# Patient Record
Sex: MALE | Race: WHITE | NOT HISPANIC OR LATINO | URBAN - METROPOLITAN AREA
[De-identification: names, ages, dates, MRNs, and addresses within clinical notes are randomized per-mention and may not be internally consistent; named-entity substitution may affect disease eponyms.]

---

## 2018-02-16 ENCOUNTER — OUTPATIENT (OUTPATIENT)
Dept: OUTPATIENT SERVICES | Facility: HOSPITAL | Age: 63
LOS: 1 days | Discharge: HOME | End: 2018-02-16
Payer: COMMERCIAL

## 2018-02-16 VITALS
WEIGHT: 178.35 LBS | HEART RATE: 76 BPM | TEMPERATURE: 99 F | SYSTOLIC BLOOD PRESSURE: 131 MMHG | RESPIRATION RATE: 17 BRPM | OXYGEN SATURATION: 97 % | DIASTOLIC BLOOD PRESSURE: 81 MMHG | HEIGHT: 71 IN

## 2018-02-16 DIAGNOSIS — Z98.890 OTHER SPECIFIED POSTPROCEDURAL STATES: Chronic | ICD-10-CM

## 2018-02-16 DIAGNOSIS — M54.16 RADICULOPATHY, LUMBAR REGION: ICD-10-CM

## 2018-02-16 DIAGNOSIS — Z01.818 ENCOUNTER FOR OTHER PREPROCEDURAL EXAMINATION: ICD-10-CM

## 2018-02-16 LAB
ANION GAP SERPL CALC-SCNC: 3 MMOL/L — LOW (ref 5–17)
APTT BLD: 32.2 SEC — SIGNIFICANT CHANGE UP (ref 27.5–37.4)
BLD GP AB SCN SERPL QL: SIGNIFICANT CHANGE UP
BUN SERPL-MCNC: 15 MG/DL — SIGNIFICANT CHANGE UP (ref 7–23)
CALCIUM SERPL-MCNC: 8.9 MG/DL — SIGNIFICANT CHANGE UP (ref 8.5–10.1)
CHLORIDE SERPL-SCNC: 105 MMOL/L — SIGNIFICANT CHANGE UP (ref 96–108)
CO2 SERPL-SCNC: 32 MMOL/L — HIGH (ref 22–31)
CREAT SERPL-MCNC: 0.92 MG/DL — SIGNIFICANT CHANGE UP (ref 0.5–1.3)
GLUCOSE SERPL-MCNC: 99 MG/DL — SIGNIFICANT CHANGE UP (ref 70–99)
HBA1C BLD-MCNC: 5.5 % — SIGNIFICANT CHANGE UP (ref 4–5.6)
HCT VFR BLD CALC: 39.4 % — SIGNIFICANT CHANGE UP (ref 39–50)
HGB BLD-MCNC: 12.9 G/DL — LOW (ref 13–17)
INR BLD: 0.95 RATIO — SIGNIFICANT CHANGE UP (ref 0.88–1.16)
MCHC RBC-ENTMCNC: 27 PG — SIGNIFICANT CHANGE UP (ref 27–34)
MCHC RBC-ENTMCNC: 32.7 GM/DL — SIGNIFICANT CHANGE UP (ref 32–36)
MCV RBC AUTO: 82.6 FL — SIGNIFICANT CHANGE UP (ref 80–100)
NRBC # BLD: 0 /100 WBCS — SIGNIFICANT CHANGE UP (ref 0–0)
PLATELET # BLD AUTO: 317 K/UL — SIGNIFICANT CHANGE UP (ref 150–400)
POTASSIUM SERPL-MCNC: 4.4 MMOL/L — SIGNIFICANT CHANGE UP (ref 3.5–5.3)
POTASSIUM SERPL-SCNC: 4.4 MMOL/L — SIGNIFICANT CHANGE UP (ref 3.5–5.3)
PROTHROM AB SERPL-ACNC: 10.3 SEC — SIGNIFICANT CHANGE UP (ref 9.8–12.7)
RBC # BLD: 4.77 M/UL — SIGNIFICANT CHANGE UP (ref 4.2–5.8)
RBC # FLD: 12.5 % — SIGNIFICANT CHANGE UP (ref 10.3–14.5)
SODIUM SERPL-SCNC: 140 MMOL/L — SIGNIFICANT CHANGE UP (ref 135–145)
WBC # BLD: 7.68 K/UL — SIGNIFICANT CHANGE UP (ref 3.8–10.5)
WBC # FLD AUTO: 7.68 K/UL — SIGNIFICANT CHANGE UP (ref 3.8–10.5)

## 2018-02-16 PROCEDURE — 93010 ELECTROCARDIOGRAM REPORT: CPT | Mod: NC

## 2018-02-16 NOTE — PATIENT PROFILE ADULT. - PSH
S/P cervical disc replacement  2017  S/P gastric surgery  - Gastric Sleeve ( 4 years ago )  S/P lumbar laminectomy  2 years ago

## 2018-02-16 NOTE — H&P PST ADULT - HISTORY OF PRESENT ILLNESS
A 62 M with HTN c/o back and leg pain.   Patient s/p Lumbar Laminectomy 2 years ago. Post op was doing well with radiculopathic sign improved until Last December, patient started complaining of Back pain radiating to the right post thigh/leg associated with numbness o n the medial thigh.  Persistence of symptoms prompted consult and advised this procedure. +Weakness on right LE

## 2018-03-02 ENCOUNTER — INPATIENT (INPATIENT)
Facility: HOSPITAL | Age: 63
LOS: 2 days | Discharge: HOME | End: 2018-03-05
Attending: ORTHOPAEDIC SURGERY | Admitting: ORTHOPAEDIC SURGERY
Payer: COMMERCIAL

## 2018-03-02 ENCOUNTER — RESULT REVIEW (OUTPATIENT)
Age: 63
End: 2018-03-02

## 2018-03-02 ENCOUNTER — TRANSCRIPTION ENCOUNTER (OUTPATIENT)
Age: 63
End: 2018-03-02

## 2018-03-02 VITALS
TEMPERATURE: 97 F | DIASTOLIC BLOOD PRESSURE: 63 MMHG | WEIGHT: 169.09 LBS | HEIGHT: 70 IN | OXYGEN SATURATION: 98 % | SYSTOLIC BLOOD PRESSURE: 112 MMHG | RESPIRATION RATE: 15 BRPM | HEART RATE: 67 BPM

## 2018-03-02 DIAGNOSIS — Z98.890 OTHER SPECIFIED POSTPROCEDURAL STATES: Chronic | ICD-10-CM

## 2018-03-02 PROCEDURE — 88304 TISSUE EXAM BY PATHOLOGIST: CPT | Mod: 26

## 2018-03-02 RX ORDER — OXYCODONE HYDROCHLORIDE 5 MG/1
15 TABLET ORAL EVERY 4 HOURS
Qty: 0 | Refills: 0 | Status: DISCONTINUED | OUTPATIENT
Start: 2018-03-02 | End: 2018-03-04

## 2018-03-02 RX ORDER — OXYCODONE HYDROCHLORIDE 5 MG/1
20 TABLET ORAL EVERY 4 HOURS
Qty: 0 | Refills: 0 | Status: DISCONTINUED | OUTPATIENT
Start: 2018-03-02 | End: 2018-03-04

## 2018-03-02 RX ORDER — PANTOPRAZOLE SODIUM 20 MG/1
40 TABLET, DELAYED RELEASE ORAL
Qty: 0 | Refills: 0 | Status: DISCONTINUED | OUTPATIENT
Start: 2018-03-02 | End: 2018-03-05

## 2018-03-02 RX ORDER — HYDROMORPHONE HYDROCHLORIDE 2 MG/ML
1 INJECTION INTRAMUSCULAR; INTRAVENOUS; SUBCUTANEOUS
Qty: 0 | Refills: 0 | Status: DISCONTINUED | OUTPATIENT
Start: 2018-03-02 | End: 2018-03-02

## 2018-03-02 RX ORDER — METOCLOPRAMIDE HCL 10 MG
10 TABLET ORAL ONCE
Qty: 0 | Refills: 0 | Status: DISCONTINUED | OUTPATIENT
Start: 2018-03-02 | End: 2018-03-05

## 2018-03-02 RX ORDER — ACETAMINOPHEN 500 MG
1000 TABLET ORAL ONCE
Qty: 0 | Refills: 0 | Status: COMPLETED | OUTPATIENT
Start: 2018-03-02 | End: 2018-03-03

## 2018-03-02 RX ORDER — LOSARTAN POTASSIUM 100 MG/1
100 TABLET, FILM COATED ORAL DAILY
Qty: 0 | Refills: 0 | Status: DISCONTINUED | OUTPATIENT
Start: 2018-03-02 | End: 2018-03-05

## 2018-03-02 RX ORDER — HYDROMORPHONE HYDROCHLORIDE 2 MG/ML
0.5 INJECTION INTRAMUSCULAR; INTRAVENOUS; SUBCUTANEOUS ONCE
Qty: 0 | Refills: 0 | Status: DISCONTINUED | OUTPATIENT
Start: 2018-03-02 | End: 2018-03-02

## 2018-03-02 RX ORDER — OXYCODONE HYDROCHLORIDE 5 MG/1
15 TABLET ORAL EVERY 4 HOURS
Qty: 0 | Refills: 0 | Status: DISCONTINUED | OUTPATIENT
Start: 2018-03-02 | End: 2018-03-02

## 2018-03-02 RX ORDER — OXYCODONE HYDROCHLORIDE 5 MG/1
1 TABLET ORAL
Qty: 0 | Refills: 0 | COMMUNITY

## 2018-03-02 RX ORDER — CYCLOBENZAPRINE HYDROCHLORIDE 10 MG/1
10 TABLET, FILM COATED ORAL EVERY 8 HOURS
Qty: 0 | Refills: 0 | Status: DISCONTINUED | OUTPATIENT
Start: 2018-03-02 | End: 2018-03-05

## 2018-03-02 RX ORDER — SODIUM CHLORIDE 9 MG/ML
1000 INJECTION, SOLUTION INTRAVENOUS
Qty: 0 | Refills: 0 | Status: DISCONTINUED | OUTPATIENT
Start: 2018-03-02 | End: 2018-03-02

## 2018-03-02 RX ORDER — OXYCODONE HYDROCHLORIDE 5 MG/1
20 TABLET ORAL EVERY 12 HOURS
Qty: 0 | Refills: 0 | Status: DISCONTINUED | OUTPATIENT
Start: 2018-03-02 | End: 2018-03-05

## 2018-03-02 RX ORDER — DIPHENHYDRAMINE HCL 50 MG
12.5 CAPSULE ORAL EVERY 4 HOURS
Qty: 0 | Refills: 0 | Status: DISCONTINUED | OUTPATIENT
Start: 2018-03-02 | End: 2018-03-04

## 2018-03-02 RX ORDER — SODIUM CHLORIDE 9 MG/ML
1000 INJECTION, SOLUTION INTRAVENOUS
Qty: 0 | Refills: 0 | Status: DISCONTINUED | OUTPATIENT
Start: 2018-03-02 | End: 2018-03-04

## 2018-03-02 RX ORDER — CEFAZOLIN SODIUM 1 G
2000 VIAL (EA) INJECTION EVERY 8 HOURS
Qty: 0 | Refills: 0 | Status: COMPLETED | OUTPATIENT
Start: 2018-03-02 | End: 2018-03-03

## 2018-03-02 RX ORDER — OXYCODONE HYDROCHLORIDE 5 MG/1
10 TABLET ORAL EVERY 4 HOURS
Qty: 0 | Refills: 0 | Status: DISCONTINUED | OUTPATIENT
Start: 2018-03-02 | End: 2018-03-02

## 2018-03-02 RX ORDER — ACETAMINOPHEN 500 MG
1000 TABLET ORAL ONCE
Qty: 0 | Refills: 0 | Status: COMPLETED | OUTPATIENT
Start: 2018-03-02 | End: 2018-03-02

## 2018-03-02 RX ORDER — AMLODIPINE BESYLATE 2.5 MG/1
5 TABLET ORAL DAILY
Qty: 0 | Refills: 0 | Status: DISCONTINUED | OUTPATIENT
Start: 2018-03-02 | End: 2018-03-05

## 2018-03-02 RX ADMIN — HYDROMORPHONE HYDROCHLORIDE 0.5 MILLIGRAM(S): 2 INJECTION INTRAMUSCULAR; INTRAVENOUS; SUBCUTANEOUS at 20:20

## 2018-03-02 RX ADMIN — SODIUM CHLORIDE 75 MILLILITER(S): 9 INJECTION, SOLUTION INTRAVENOUS at 15:53

## 2018-03-02 RX ADMIN — HYDROMORPHONE HYDROCHLORIDE 1 MILLIGRAM(S): 2 INJECTION INTRAMUSCULAR; INTRAVENOUS; SUBCUTANEOUS at 15:19

## 2018-03-02 RX ADMIN — Medication 1000 MILLIGRAM(S): at 16:03

## 2018-03-02 RX ADMIN — SODIUM CHLORIDE 100 MILLILITER(S): 9 INJECTION, SOLUTION INTRAVENOUS at 22:59

## 2018-03-02 RX ADMIN — OXYCODONE HYDROCHLORIDE 20 MILLIGRAM(S): 5 TABLET ORAL at 22:55

## 2018-03-02 RX ADMIN — Medication 75 MILLIGRAM(S): at 18:44

## 2018-03-02 RX ADMIN — OXYCODONE HYDROCHLORIDE 20 MILLIGRAM(S): 5 TABLET ORAL at 23:55

## 2018-03-02 RX ADMIN — HYDROMORPHONE HYDROCHLORIDE 1 MILLIGRAM(S): 2 INJECTION INTRAMUSCULAR; INTRAVENOUS; SUBCUTANEOUS at 16:04

## 2018-03-02 RX ADMIN — HYDROMORPHONE HYDROCHLORIDE 0.5 MILLIGRAM(S): 2 INJECTION INTRAMUSCULAR; INTRAVENOUS; SUBCUTANEOUS at 20:35

## 2018-03-02 RX ADMIN — HYDROMORPHONE HYDROCHLORIDE 1 MILLIGRAM(S): 2 INJECTION INTRAMUSCULAR; INTRAVENOUS; SUBCUTANEOUS at 15:30

## 2018-03-02 RX ADMIN — OXYCODONE HYDROCHLORIDE 20 MILLIGRAM(S): 5 TABLET ORAL at 20:20

## 2018-03-02 RX ADMIN — CYCLOBENZAPRINE HYDROCHLORIDE 10 MILLIGRAM(S): 10 TABLET, FILM COATED ORAL at 22:05

## 2018-03-02 RX ADMIN — Medication 100 MILLIGRAM(S): at 20:21

## 2018-03-02 RX ADMIN — OXYCODONE HYDROCHLORIDE 20 MILLIGRAM(S): 5 TABLET ORAL at 21:20

## 2018-03-02 RX ADMIN — Medication 400 MILLIGRAM(S): at 15:53

## 2018-03-02 RX ADMIN — HYDROMORPHONE HYDROCHLORIDE 1 MILLIGRAM(S): 2 INJECTION INTRAMUSCULAR; INTRAVENOUS; SUBCUTANEOUS at 15:54

## 2018-03-02 RX ADMIN — OXYCODONE HYDROCHLORIDE 15 MILLIGRAM(S): 5 TABLET ORAL at 18:36

## 2018-03-02 NOTE — PROGRESS NOTE ADULT - SUBJECTIVE AND OBJECTIVE BOX
62yMale s/p TLIF L4-5 POD#0. Pt seen and examined in NAD. Pain uncontrolled. Pt denies any new complaints. Pt denies CP/SOB/N/V/D/numbness/tingling/bowel or bladder dysfunction.     PE:   Spine: Dressing c/d/i   B/L UE: Skin intact. +ROM shoulder/elbow/wrist/fingers. +ok/thumbsup/fingercross signs.  strength: 5/5.  RP2+ NVI.   B/L LE: Skin intact. +ROM hip/knee/ankle/toes. Ankle Dorsi/plantarflexion: 5/5. Calf: soft, compressible and nontender. DP/PT 2+ NVI.         A/P: 62yMale s/p TLIF L4-5 POD#0 .  Pain control: oxycodone: 15/20 q4H; Oxycontin 20mg q12h  PT: WBAT - spinal precautions   DVT ppx: SCDs   Wound care, Isometric exercises, incentive spirometry   Discharge: planning   All the above discussed and understood by pt 62yMale s/p TLIF L4-5 POD#0. Pt seen and examined in NAD. Pain uncontrolled. Pt denies any new complaints. Pt denies CP/SOB/N/V/D/numbness/tingling/bowel or bladder dysfunction.     PE:   Spine: Dressing c/d/i   B/L UE: Skin intact. +ROM shoulder/elbow/wrist/fingers. +ok/thumbsup/fingercross signs.  strength: 5/5.  RP2+ NVI.   B/L LE: Skin intact. +ROM hip/knee/ankle/toes. Ankle Dorsi/plantarflexion: 5/5. Calf: soft, compressible and nontender. DP/PT 2+ NVI.         A/P: 62yMale s/p TLIF L4-5 POD#0 .  Pain control: oxycodone: 15/20 q4H; Oxycontin 20mg q12h; dilaudid 0.5mg IVP x 1 dose - pt placed on continuos o2- spoke to nursing team to monitor pt overnight as well.   PT: WBAT - spinal precautions   DVT ppx: SCDs   Wound care, Isometric exercises, incentive spirometry   Discharge: planning   All the above discussed and understood by pt 62yMale s/p TLIF L4-5 POD#0. Pt seen and examined in NAD. Preop symptoms LLE numbness and tingling. Pt admits its to early to say if numbness is improving. Pain uncontrolled. Pt admits he takes oxycodone 30mg TID and isn't getting enough pain medicine at this time. Pt denies any other complaints bedsides pain. Pt denies CP/SOB/N/V/D/numbness/tingling/bowel or bladder dysfunction.     PE:   Spine: Dressing c/d/i   B/L UE: Skin intact. +ROM shoulder/elbow/wrist/fingers. +ok/thumbsup/fingercross signs.  strength: 5/5.  RP2+ NVI.   B/L LE: Skin intact. +ROM hip/knee/ankle/toes. Ankle Dorsi/plantarflexion: 5/5. Calf: soft, compressible and nontender. DP/PT 2+ NVI.         A/P: 62yMale s/p TLIF L4-5 POD#0 .  Pain control: oxycodone: 15/20 q4H; Oxycontin 20mg q12h; dilaudid 0.5mg IVP x 1 dose - pt placed on continuos o2- spoke to nursing team to monitor pt overnight as well.    PT: WBAT - spinal precautions   DVT ppx: SCDs   Wound care, Isometric exercises, incentive spirometry   Discharge: planning   All the above discussed and understood by pt

## 2018-03-02 NOTE — BRIEF OPERATIVE NOTE - PROCEDURE
<<-----Click on this checkbox to enter Procedure Transforaminal lumbar interbody fusion (TLIF)  03/02/2018  L 4/5  Active  MURPHY

## 2018-03-03 LAB
ANION GAP SERPL CALC-SCNC: 6 MMOL/L — SIGNIFICANT CHANGE UP (ref 5–17)
BASOPHILS # BLD AUTO: 0.02 K/UL — SIGNIFICANT CHANGE UP (ref 0–0.2)
BASOPHILS NFR BLD AUTO: 0.2 % — SIGNIFICANT CHANGE UP (ref 0–2)
BUN SERPL-MCNC: 11 MG/DL — SIGNIFICANT CHANGE UP (ref 7–23)
CALCIUM SERPL-MCNC: 8.2 MG/DL — LOW (ref 8.5–10.1)
CHLORIDE SERPL-SCNC: 106 MMOL/L — SIGNIFICANT CHANGE UP (ref 96–108)
CO2 SERPL-SCNC: 31 MMOL/L — SIGNIFICANT CHANGE UP (ref 22–31)
CREAT SERPL-MCNC: 0.98 MG/DL — SIGNIFICANT CHANGE UP (ref 0.5–1.3)
EOSINOPHIL # BLD AUTO: 0.02 K/UL — SIGNIFICANT CHANGE UP (ref 0–0.5)
EOSINOPHIL NFR BLD AUTO: 0.2 % — SIGNIFICANT CHANGE UP (ref 0–6)
GLUCOSE SERPL-MCNC: 149 MG/DL — HIGH (ref 70–99)
HCT VFR BLD CALC: 35.9 % — LOW (ref 39–50)
HGB BLD-MCNC: 11.6 G/DL — LOW (ref 13–17)
IMM GRANULOCYTES NFR BLD AUTO: 0.5 % — SIGNIFICANT CHANGE UP (ref 0–1.5)
LYMPHOCYTES # BLD AUTO: 1.71 K/UL — SIGNIFICANT CHANGE UP (ref 1–3.3)
LYMPHOCYTES # BLD AUTO: 13.4 % — SIGNIFICANT CHANGE UP (ref 13–44)
MCHC RBC-ENTMCNC: 27.3 PG — SIGNIFICANT CHANGE UP (ref 27–34)
MCHC RBC-ENTMCNC: 32.3 GM/DL — SIGNIFICANT CHANGE UP (ref 32–36)
MCV RBC AUTO: 84.5 FL — SIGNIFICANT CHANGE UP (ref 80–100)
MONOCYTES # BLD AUTO: 1.08 K/UL — HIGH (ref 0–0.9)
MONOCYTES NFR BLD AUTO: 8.5 % — SIGNIFICANT CHANGE UP (ref 2–14)
NEUTROPHILS # BLD AUTO: 9.85 K/UL — HIGH (ref 1.8–7.4)
NEUTROPHILS NFR BLD AUTO: 77.2 % — HIGH (ref 43–77)
NRBC # BLD: 0 /100 WBCS — SIGNIFICANT CHANGE UP (ref 0–0)
PLATELET # BLD AUTO: 246 K/UL — SIGNIFICANT CHANGE UP (ref 150–400)
POTASSIUM SERPL-MCNC: 4.6 MMOL/L — SIGNIFICANT CHANGE UP (ref 3.5–5.3)
POTASSIUM SERPL-SCNC: 4.6 MMOL/L — SIGNIFICANT CHANGE UP (ref 3.5–5.3)
RBC # BLD: 4.25 M/UL — SIGNIFICANT CHANGE UP (ref 4.2–5.8)
RBC # FLD: 13.1 % — SIGNIFICANT CHANGE UP (ref 10.3–14.5)
SODIUM SERPL-SCNC: 143 MMOL/L — SIGNIFICANT CHANGE UP (ref 135–145)
WBC # BLD: 12.74 K/UL — HIGH (ref 3.8–10.5)
WBC # FLD AUTO: 12.74 K/UL — HIGH (ref 3.8–10.5)

## 2018-03-03 RX ADMIN — PANTOPRAZOLE SODIUM 40 MILLIGRAM(S): 20 TABLET, DELAYED RELEASE ORAL at 08:20

## 2018-03-03 RX ADMIN — OXYCODONE HYDROCHLORIDE 20 MILLIGRAM(S): 5 TABLET ORAL at 08:46

## 2018-03-03 RX ADMIN — CYCLOBENZAPRINE HYDROCHLORIDE 10 MILLIGRAM(S): 10 TABLET, FILM COATED ORAL at 05:32

## 2018-03-03 RX ADMIN — Medication 400 MILLIGRAM(S): at 11:31

## 2018-03-03 RX ADMIN — CYCLOBENZAPRINE HYDROCHLORIDE 10 MILLIGRAM(S): 10 TABLET, FILM COATED ORAL at 22:03

## 2018-03-03 RX ADMIN — CYCLOBENZAPRINE HYDROCHLORIDE 10 MILLIGRAM(S): 10 TABLET, FILM COATED ORAL at 13:02

## 2018-03-03 RX ADMIN — OXYCODONE HYDROCHLORIDE 20 MILLIGRAM(S): 5 TABLET ORAL at 22:03

## 2018-03-03 RX ADMIN — OXYCODONE HYDROCHLORIDE 20 MILLIGRAM(S): 5 TABLET ORAL at 18:30

## 2018-03-03 RX ADMIN — OXYCODONE HYDROCHLORIDE 20 MILLIGRAM(S): 5 TABLET ORAL at 13:02

## 2018-03-03 RX ADMIN — OXYCODONE HYDROCHLORIDE 20 MILLIGRAM(S): 5 TABLET ORAL at 14:00

## 2018-03-03 RX ADMIN — OXYCODONE HYDROCHLORIDE 20 MILLIGRAM(S): 5 TABLET ORAL at 05:32

## 2018-03-03 RX ADMIN — Medication 75 MILLIGRAM(S): at 05:32

## 2018-03-03 RX ADMIN — OXYCODONE HYDROCHLORIDE 20 MILLIGRAM(S): 5 TABLET ORAL at 05:13

## 2018-03-03 RX ADMIN — OXYCODONE HYDROCHLORIDE 20 MILLIGRAM(S): 5 TABLET ORAL at 23:00

## 2018-03-03 RX ADMIN — Medication 1000 MILLIGRAM(S): at 11:45

## 2018-03-03 RX ADMIN — OXYCODONE HYDROCHLORIDE 20 MILLIGRAM(S): 5 TABLET ORAL at 17:24

## 2018-03-03 RX ADMIN — LOSARTAN POTASSIUM 100 MILLIGRAM(S): 100 TABLET, FILM COATED ORAL at 05:31

## 2018-03-03 RX ADMIN — OXYCODONE HYDROCHLORIDE 20 MILLIGRAM(S): 5 TABLET ORAL at 09:30

## 2018-03-03 RX ADMIN — AMLODIPINE BESYLATE 5 MILLIGRAM(S): 2.5 TABLET ORAL at 05:39

## 2018-03-03 RX ADMIN — Medication 75 MILLIGRAM(S): at 17:24

## 2018-03-03 RX ADMIN — Medication 100 MILLIGRAM(S): at 04:12

## 2018-03-03 RX ADMIN — OXYCODONE HYDROCHLORIDE 20 MILLIGRAM(S): 5 TABLET ORAL at 04:13

## 2018-03-03 RX ADMIN — OXYCODONE HYDROCHLORIDE 20 MILLIGRAM(S): 5 TABLET ORAL at 06:32

## 2018-03-03 RX ADMIN — SODIUM CHLORIDE 100 MILLILITER(S): 9 INJECTION, SOLUTION INTRAVENOUS at 11:31

## 2018-03-03 NOTE — PHYSICAL THERAPY INITIAL EVALUATION ADULT - PERTINENT HX OF CURRENT PROBLEM, REHAB EVAL
Patient came in for elective spinal surgery. Now s/p POD 1 TLIF L4-L5.  Neurovascularly intact on both lower limbs. However, endorses slightly less sensations from thigh down to foot on left lower limb (90% intact).

## 2018-03-03 NOTE — PHYSICAL THERAPY INITIAL EVALUATION ADULT - GENERAL OBSERVATIONS, REHAB EVAL
Patient encountered supine in bed, vital signs as charted. Attachments: cardiac monitor, IVAB, urinary catheter.AAOx4.Reports pain to low back; denies shortness of breath at rest.

## 2018-03-03 NOTE — PHYSICAL THERAPY INITIAL EVALUATION ADULT - CRITERIA FOR SKILLED THERAPEUTIC INTERVENTIONS
impairments found/anticipated discharge recommendation/rehab potential/functional limitations in following categories/risk reduction/prevention

## 2018-03-03 NOTE — PHYSICAL THERAPY INITIAL EVALUATION ADULT - MODIFIED CLINICAL TEST OF SENSORY INTEGRATION IN BALANCE TEST
Barthel Index: Feeding Score _10__, Bathing Score _0__, Grooming Score _0__, Dressing Score _5__, Bowels Score _0__, Bladder Score _0__, Toilet Score _0__, Transfers Score _10__, Mobility Score _10__, Stairs Score _0 _,     Total Score __25_

## 2018-03-03 NOTE — PHYSICAL THERAPY INITIAL EVALUATION ADULT - IMPAIRMENTS FOUND, PT EVAL
aerobic capacity/endurance/circulation/cranial and peripheral nerve integrity/muscle strength/sensory integrity/ergonomics and body mechanics/gait, locomotion, and balance/joint integrity and mobility/posture/ROM/neuromotor development and sensory integration

## 2018-03-03 NOTE — PHYSICAL THERAPY INITIAL EVALUATION ADULT - ADDITIONAL COMMENTS
Per patient, lives c wife in private house without stair steps to enter. Independent in all ADLs and PADLs prior to admission. Reports had left lower limb symptoms prior to admission, much worse than current sensory issues post-op.

## 2018-03-03 NOTE — PROGRESS NOTE ADULT - SUBJECTIVE AND OBJECTIVE BOX
62yMale s/p TLIF L4-5 POD#1. Pt seen and examined in NAD. Preop symptoms LLE numbness and tingling. Pain controlled at this time. Pt admits he may need some help during physical therapy. Pt denies any other complaints bedsides pain. Pt denies CP/SOB/N/V/D/numbness/tingling/bowel or bladder dysfunction.     PE:   Spine: Dressing c/d/i   B/L UE: Skin intact. +ROM shoulder/elbow/wrist/fingers. +ok/thumbsup/fingercross signs.  strength: 5/5.  RP2+ NVI.   B/L LE: Skin intact. +ROM hip/knee/ankle/toes. Ankle Dorsi/plantarflexion: 5/5. Calf: soft, compressible and nontender. DP/PT 2+ NVI.         A/P: 62yMale s/p TLIF L4-5 POD#1 .  Pain control: oxycodone: 15/20 q4H; Oxycontin 20mg q12h;   PT: WBAT - spinal precautions   DVT ppx: SCDs   Wound care, Isometric exercises, incentive spirometry   Discharge: planning   All the above discussed and understood by pt

## 2018-03-03 NOTE — PHYSICAL THERAPY INITIAL EVALUATION ADULT - PLANNED THERAPY INTERVENTIONS, PT EVAL
gait training/postural re-education/strengthening/joint mobilization/lumbar stabilization/manual therapy techniques/neuromuscular re-education/ROM/bed mobility training/stretching/transfer training/balance training

## 2018-03-04 LAB
ANION GAP SERPL CALC-SCNC: 7 MMOL/L — SIGNIFICANT CHANGE UP (ref 5–17)
BASOPHILS # BLD AUTO: 0.03 K/UL — SIGNIFICANT CHANGE UP (ref 0–0.2)
BASOPHILS NFR BLD AUTO: 0.3 % — SIGNIFICANT CHANGE UP (ref 0–2)
BUN SERPL-MCNC: 12 MG/DL — SIGNIFICANT CHANGE UP (ref 7–23)
CALCIUM SERPL-MCNC: 8.2 MG/DL — LOW (ref 8.5–10.1)
CHLORIDE SERPL-SCNC: 105 MMOL/L — SIGNIFICANT CHANGE UP (ref 96–108)
CO2 SERPL-SCNC: 31 MMOL/L — SIGNIFICANT CHANGE UP (ref 22–31)
CREAT SERPL-MCNC: 0.86 MG/DL — SIGNIFICANT CHANGE UP (ref 0.5–1.3)
EOSINOPHIL # BLD AUTO: 0.18 K/UL — SIGNIFICANT CHANGE UP (ref 0–0.5)
EOSINOPHIL NFR BLD AUTO: 1.6 % — SIGNIFICANT CHANGE UP (ref 0–6)
GLUCOSE SERPL-MCNC: 91 MG/DL — SIGNIFICANT CHANGE UP (ref 70–99)
HCT VFR BLD CALC: 33.6 % — LOW (ref 39–50)
HGB BLD-MCNC: 10.7 G/DL — LOW (ref 13–17)
IMM GRANULOCYTES NFR BLD AUTO: 0.5 % — SIGNIFICANT CHANGE UP (ref 0–1.5)
LYMPHOCYTES # BLD AUTO: 18.1 % — SIGNIFICANT CHANGE UP (ref 13–44)
LYMPHOCYTES # BLD AUTO: 2 K/UL — SIGNIFICANT CHANGE UP (ref 1–3.3)
MCHC RBC-ENTMCNC: 26.6 PG — LOW (ref 27–34)
MCHC RBC-ENTMCNC: 31.8 GM/DL — LOW (ref 32–36)
MCV RBC AUTO: 83.4 FL — SIGNIFICANT CHANGE UP (ref 80–100)
MONOCYTES # BLD AUTO: 1.39 K/UL — HIGH (ref 0–0.9)
MONOCYTES NFR BLD AUTO: 12.6 % — SIGNIFICANT CHANGE UP (ref 2–14)
NEUTROPHILS # BLD AUTO: 7.38 K/UL — SIGNIFICANT CHANGE UP (ref 1.8–7.4)
NEUTROPHILS NFR BLD AUTO: 66.9 % — SIGNIFICANT CHANGE UP (ref 43–77)
NRBC # BLD: 0 /100 WBCS — SIGNIFICANT CHANGE UP (ref 0–0)
PLATELET # BLD AUTO: 228 K/UL — SIGNIFICANT CHANGE UP (ref 150–400)
POTASSIUM SERPL-MCNC: 3.9 MMOL/L — SIGNIFICANT CHANGE UP (ref 3.5–5.3)
POTASSIUM SERPL-SCNC: 3.9 MMOL/L — SIGNIFICANT CHANGE UP (ref 3.5–5.3)
RBC # BLD: 4.03 M/UL — LOW (ref 4.2–5.8)
RBC # FLD: 13.3 % — SIGNIFICANT CHANGE UP (ref 10.3–14.5)
SODIUM SERPL-SCNC: 143 MMOL/L — SIGNIFICANT CHANGE UP (ref 135–145)
WBC # BLD: 11.04 K/UL — HIGH (ref 3.8–10.5)
WBC # FLD AUTO: 11.04 K/UL — HIGH (ref 3.8–10.5)

## 2018-03-04 RX ORDER — ACETAMINOPHEN 500 MG
1000 TABLET ORAL ONCE
Qty: 0 | Refills: 0 | Status: COMPLETED | OUTPATIENT
Start: 2018-03-04 | End: 2018-03-04

## 2018-03-04 RX ORDER — OXYCODONE HYDROCHLORIDE 5 MG/1
10 TABLET ORAL EVERY 4 HOURS
Qty: 0 | Refills: 0 | Status: DISCONTINUED | OUTPATIENT
Start: 2018-03-04 | End: 2018-03-05

## 2018-03-04 RX ADMIN — Medication 75 MILLIGRAM(S): at 17:43

## 2018-03-04 RX ADMIN — CYCLOBENZAPRINE HYDROCHLORIDE 10 MILLIGRAM(S): 10 TABLET, FILM COATED ORAL at 05:23

## 2018-03-04 RX ADMIN — CYCLOBENZAPRINE HYDROCHLORIDE 10 MILLIGRAM(S): 10 TABLET, FILM COATED ORAL at 13:28

## 2018-03-04 RX ADMIN — OXYCODONE HYDROCHLORIDE 20 MILLIGRAM(S): 5 TABLET ORAL at 17:43

## 2018-03-04 RX ADMIN — OXYCODONE HYDROCHLORIDE 10 MILLIGRAM(S): 5 TABLET ORAL at 21:11

## 2018-03-04 RX ADMIN — OXYCODONE HYDROCHLORIDE 20 MILLIGRAM(S): 5 TABLET ORAL at 07:31

## 2018-03-04 RX ADMIN — PANTOPRAZOLE SODIUM 40 MILLIGRAM(S): 20 TABLET, DELAYED RELEASE ORAL at 07:31

## 2018-03-04 RX ADMIN — CYCLOBENZAPRINE HYDROCHLORIDE 10 MILLIGRAM(S): 10 TABLET, FILM COATED ORAL at 20:11

## 2018-03-04 RX ADMIN — Medication 1000 MILLIGRAM(S): at 16:10

## 2018-03-04 RX ADMIN — OXYCODONE HYDROCHLORIDE 10 MILLIGRAM(S): 5 TABLET ORAL at 20:11

## 2018-03-04 RX ADMIN — LOSARTAN POTASSIUM 100 MILLIGRAM(S): 100 TABLET, FILM COATED ORAL at 05:23

## 2018-03-04 RX ADMIN — SODIUM CHLORIDE 100 MILLILITER(S): 9 INJECTION, SOLUTION INTRAVENOUS at 03:25

## 2018-03-04 RX ADMIN — OXYCODONE HYDROCHLORIDE 20 MILLIGRAM(S): 5 TABLET ORAL at 05:21

## 2018-03-04 RX ADMIN — OXYCODONE HYDROCHLORIDE 20 MILLIGRAM(S): 5 TABLET ORAL at 08:25

## 2018-03-04 RX ADMIN — OXYCODONE HYDROCHLORIDE 20 MILLIGRAM(S): 5 TABLET ORAL at 03:10

## 2018-03-04 RX ADMIN — AMLODIPINE BESYLATE 5 MILLIGRAM(S): 2.5 TABLET ORAL at 05:24

## 2018-03-04 RX ADMIN — Medication 75 MILLIGRAM(S): at 05:21

## 2018-03-04 RX ADMIN — OXYCODONE HYDROCHLORIDE 20 MILLIGRAM(S): 5 TABLET ORAL at 06:20

## 2018-03-04 RX ADMIN — OXYCODONE HYDROCHLORIDE 20 MILLIGRAM(S): 5 TABLET ORAL at 02:14

## 2018-03-04 RX ADMIN — Medication 400 MILLIGRAM(S): at 15:57

## 2018-03-04 NOTE — PROGRESS NOTE ADULT - SUBJECTIVE AND OBJECTIVE BOX
Patient seen and examined at bedside, resting comfortably.  C/o lower back pain localized to surgical wound.  Denies fever, chills, chest pain, sob.  OOB with PT.     Vital Signs Last 24 Hrs  T(F): 99.9 (03-04-18 @ 05:59), Max: 99.9 (03-04-18 @ 05:59)  HR: 86 (03-04-18 @ 07:34)  BP: 150/78 (03-04-18 @ 07:34)  RR: 16 (03-04-18 @ 05:59)  SpO2: 94% (03-04-18 @ 05:59)    GENERAL: Alert, oriented, NAD  CHEST/LUNG: Clear to auscultation bilaterally, respirations nonlabored  HEART: S1S2, Regular rate and rhythm  BACK: Lumbar dressing c/d/i. No localized tenderness. No surrounding erythema.   EXTREMITIES: +ROM b/l LE. Ankle dorsi/plantarflexion. No calf tenderness, soft. +DP pulses. Sensation grossly intact b/l LE.    LABS:                        10.7   11.04 )-----------( 228      ( 04 Mar 2018 07:02 )             33.6     03-04    143  |  105  |  12  ----------------------------<  91  3.9   |  31  |  0.86    Ca    8.2<L>      04 Mar 2018 07:02    Impression: 62M POD#2 s/p TLIF L4-5  Plan:  - pain management PRN  - WBAT, spinal precautions  - DVT ppx  - IS  - Local wound care per surgical team  - Discharge planning  - Will d/w surgical attending Patient seen and examined at bedside, resting comfortably.  C/o lower back pain localized to surgical wound.  Also c/o "feeling woozy" from the medication.   Denies fever, chills, chest pain, sob.  OOB with PT.     Vital Signs Last 24 Hrs  T(F): 99.9 (03-04-18 @ 05:59), Max: 99.9 (03-04-18 @ 05:59)  HR: 86 (03-04-18 @ 07:34)  BP: 150/78 (03-04-18 @ 07:34)  RR: 16 (03-04-18 @ 05:59)  SpO2: 94% (03-04-18 @ 05:59)    GENERAL: Alert, oriented, NAD  CHEST/LUNG: Clear to auscultation bilaterally, respirations nonlabored  HEART: S1S2, Regular rate and rhythm  BACK: Lumbar dressing c/d/i. No localized tenderness. No surrounding erythema.   EXTREMITIES: +ROM b/l LE. Ankle dorsi/plantarflexion. No calf tenderness, soft. +DP pulses. Sensation grossly intact b/l LE.    LABS:                        10.7   11.04 )-----------( 228      ( 04 Mar 2018 07:02 )             33.6     03-04    143  |  105  |  12  ----------------------------<  91  3.9   |  31  |  0.86    Ca    8.2<L>      04 Mar 2018 07:02    Impression: 62M POD#2 s/p TLIF L4-5  Plan:  - pain management PRN, will alter medication regimen slightly   - WBAT, spinal precautions  - DVT ppx  - IS  - Local wound care per surgical team  - Discharge planning  - Will d/w surgical attending

## 2018-03-05 ENCOUNTER — TRANSCRIPTION ENCOUNTER (OUTPATIENT)
Age: 63
End: 2018-03-05

## 2018-03-05 VITALS
SYSTOLIC BLOOD PRESSURE: 130 MMHG | DIASTOLIC BLOOD PRESSURE: 82 MMHG | RESPIRATION RATE: 16 BRPM | OXYGEN SATURATION: 95 % | TEMPERATURE: 98 F | HEART RATE: 91 BPM

## 2018-03-05 LAB
ANION GAP SERPL CALC-SCNC: 7 MMOL/L — SIGNIFICANT CHANGE UP (ref 5–17)
BASOPHILS # BLD AUTO: 0.04 K/UL — SIGNIFICANT CHANGE UP (ref 0–0.2)
BASOPHILS NFR BLD AUTO: 0.4 % — SIGNIFICANT CHANGE UP (ref 0–2)
BUN SERPL-MCNC: 10 MG/DL — SIGNIFICANT CHANGE UP (ref 7–23)
CALCIUM SERPL-MCNC: 8.8 MG/DL — SIGNIFICANT CHANGE UP (ref 8.5–10.1)
CHLORIDE SERPL-SCNC: 101 MMOL/L — SIGNIFICANT CHANGE UP (ref 96–108)
CO2 SERPL-SCNC: 33 MMOL/L — HIGH (ref 22–31)
CREAT SERPL-MCNC: 0.78 MG/DL — SIGNIFICANT CHANGE UP (ref 0.5–1.3)
EOSINOPHIL # BLD AUTO: 0.39 K/UL — SIGNIFICANT CHANGE UP (ref 0–0.5)
EOSINOPHIL NFR BLD AUTO: 4 % — SIGNIFICANT CHANGE UP (ref 0–6)
GLUCOSE SERPL-MCNC: 79 MG/DL — SIGNIFICANT CHANGE UP (ref 70–99)
HCT VFR BLD CALC: 36.5 % — LOW (ref 39–50)
HGB BLD-MCNC: 12.1 G/DL — LOW (ref 13–17)
IMM GRANULOCYTES NFR BLD AUTO: 0.4 % — SIGNIFICANT CHANGE UP (ref 0–1.5)
LYMPHOCYTES # BLD AUTO: 1.73 K/UL — SIGNIFICANT CHANGE UP (ref 1–3.3)
LYMPHOCYTES # BLD AUTO: 17.7 % — SIGNIFICANT CHANGE UP (ref 13–44)
MCHC RBC-ENTMCNC: 27.9 PG — SIGNIFICANT CHANGE UP (ref 27–34)
MCHC RBC-ENTMCNC: 33.2 GM/DL — SIGNIFICANT CHANGE UP (ref 32–36)
MCV RBC AUTO: 84.1 FL — SIGNIFICANT CHANGE UP (ref 80–100)
MONOCYTES # BLD AUTO: 1.28 K/UL — HIGH (ref 0–0.9)
MONOCYTES NFR BLD AUTO: 13.1 % — SIGNIFICANT CHANGE UP (ref 2–14)
NEUTROPHILS # BLD AUTO: 6.28 K/UL — SIGNIFICANT CHANGE UP (ref 1.8–7.4)
NEUTROPHILS NFR BLD AUTO: 64.4 % — SIGNIFICANT CHANGE UP (ref 43–77)
NRBC # BLD: 0 /100 WBCS — SIGNIFICANT CHANGE UP (ref 0–0)
PLATELET # BLD AUTO: 232 K/UL — SIGNIFICANT CHANGE UP (ref 150–400)
POTASSIUM SERPL-MCNC: 3.8 MMOL/L — SIGNIFICANT CHANGE UP (ref 3.5–5.3)
POTASSIUM SERPL-SCNC: 3.8 MMOL/L — SIGNIFICANT CHANGE UP (ref 3.5–5.3)
RBC # BLD: 4.34 M/UL — SIGNIFICANT CHANGE UP (ref 4.2–5.8)
RBC # FLD: 12.9 % — SIGNIFICANT CHANGE UP (ref 10.3–14.5)
SODIUM SERPL-SCNC: 141 MMOL/L — SIGNIFICANT CHANGE UP (ref 135–145)
WBC # BLD: 9.76 K/UL — SIGNIFICANT CHANGE UP (ref 3.8–10.5)
WBC # FLD AUTO: 9.76 K/UL — SIGNIFICANT CHANGE UP (ref 3.8–10.5)

## 2018-03-05 RX ADMIN — LOSARTAN POTASSIUM 100 MILLIGRAM(S): 100 TABLET, FILM COATED ORAL at 05:08

## 2018-03-05 RX ADMIN — PANTOPRAZOLE SODIUM 40 MILLIGRAM(S): 20 TABLET, DELAYED RELEASE ORAL at 07:23

## 2018-03-05 RX ADMIN — CYCLOBENZAPRINE HYDROCHLORIDE 10 MILLIGRAM(S): 10 TABLET, FILM COATED ORAL at 05:08

## 2018-03-05 RX ADMIN — Medication 75 MILLIGRAM(S): at 05:08

## 2018-03-05 RX ADMIN — Medication 1000 MILLIGRAM(S): at 00:33

## 2018-03-05 RX ADMIN — OXYCODONE HYDROCHLORIDE 20 MILLIGRAM(S): 5 TABLET ORAL at 06:07

## 2018-03-05 RX ADMIN — OXYCODONE HYDROCHLORIDE 10 MILLIGRAM(S): 5 TABLET ORAL at 05:14

## 2018-03-05 RX ADMIN — Medication 400 MILLIGRAM(S): at 00:15

## 2018-03-05 RX ADMIN — AMLODIPINE BESYLATE 5 MILLIGRAM(S): 2.5 TABLET ORAL at 05:08

## 2018-03-05 RX ADMIN — OXYCODONE HYDROCHLORIDE 10 MILLIGRAM(S): 5 TABLET ORAL at 04:14

## 2018-03-05 RX ADMIN — OXYCODONE HYDROCHLORIDE 20 MILLIGRAM(S): 5 TABLET ORAL at 05:07

## 2018-03-05 NOTE — DISCHARGE NOTE ADULT - PATIENT PORTAL LINK FT
You can access the [facility] Patient Portal, offered by [facility], by registering with the following website: [email or web address]

## 2018-03-05 NOTE — DISCHARGE NOTE ADULT - ADDITIONAL INSTRUCTIONS
Please call your MD, if you have new onset of fevers, increased drainage, increased pain or increased redness around the incision site. Please return to the Emergency Department if you have chest pain or shortness of breath.    Spinal Precautions:   NO NSAIDS/ANTICOAGULATION/ANTIPLATELET MEDICATIONS  NO FLEXION/EXTENSION/TWISTING/PUSHING/PULLING/CARRYING/LIFTING OR BENDING

## 2018-03-05 NOTE — DISCHARGE NOTE ADULT - CARE PROVIDER_API CALL
Juan Pulliam), Orthopaedic Surgery  [address] [place] [address]  Phone: [phone]  Fax: [phone]

## 2018-03-05 NOTE — DISCHARGE NOTE ADULT - HOSPITAL COURSE
62yMale with history of Lumbar Radiculopathy presenting for TLIF L4-5  by Dr. Pulliam on 3/2/18. Risk and benefits of surgery were explained to the patient. The patient understood and agreed to proceed with surgery. Patient underwent the procedure with no intraoperative complications. Pt was brought in stable condition to the PACU. Once stable in PACU, pt was brought to the floor. During hospital stay pt was followed by Medicine, social work and home care during this admission. Pt had an uneventful hospital course. Patient Discharged Home with Outpatient PT

## 2018-03-05 NOTE — DISCHARGE NOTE ADULT - MEDICATION SUMMARY - MEDICATIONS TO TAKE
I will START or STAY ON the medications listed below when I get home from the hospital:    oxyCODONE 15 mg oral tablet  -- 1 tab(s) by mouth 2 times a day  -- Indication: For MAXIMUM ACCESS SURGERY TRANSFORAMINAL LUMBAR INTERBODY FUSIO    losartan 100 mg oral tablet  -- 1 tab(s) by mouth once a day  -- Indication: For MAXIMUM ACCESS SURGERY TRANSFORAMINAL LUMBAR INTERBODY FUSIO    aluminum hydroxide-magnesium hydroxide 200 mg-200 mg/5 mL oral suspension  -- 30 milliliter(s) by mouth every 12 hours, As needed, Indigestion  -- Indication: For MAXIMUM ACCESS SURGERY TRANSFORAMINAL LUMBAR INTERBODY FUSIO    Lyrica 75 mg oral capsule  -- 1 cap(s) by mouth 2 times a day  -- Indication: For MAXIMUM ACCESS SURGERY TRANSFORAMINAL LUMBAR INTERBODY FUSIO    amLODIPine 5 mg oral tablet  -- 1 tab(s) by mouth once a day  -- Indication: For MAXIMUM ACCESS SURGERY TRANSFORAMINAL LUMBAR INTERBODY FUSIO    lansoprazole 15 mg oral delayed release capsule  -- 1 cap(s) by mouth once a day  -- Indication: For MAXIMUM ACCESS SURGERY TRANSFORAMINAL LUMBAR INTERBODY FUSIO

## 2018-03-05 NOTE — PROGRESS NOTE ADULT - SUBJECTIVE AND OBJECTIVE BOX
POD#3 s/p TLIF L4-L5  62yMale Patient seen and examined, Pain controlled  Patient Denies SOB, CP, N/V/D       PE: Spine: Dressing C/D/I, Sensation/motor intact         B/L LE: Skin intact. +ROM hip/knee/ankle/toes. Ankle Dorsi/plantarflexion: 5/5. Calf: soft, compressible and nontender. DP/PT 2+ NVI.                           12.1   9.76  )-----------( 232      ( 05 Mar 2018 06:31 )             36.5       03-05    141  |  101  |  10  ----------------------------<  79  3.8   |  33<H>  |  0.78    Ca    8.8      05 Mar 2018 06:31          A: As above   P: Pain Control       DVT Prophylaxis SCD's      Incentive spirometry      PT WBAT Bi-lat LE      Isometric exercises      Discharge Planning      All the above discussed and understood by pt       Ortho to F/U

## 2018-03-05 NOTE — DISCHARGE NOTE ADULT - PLAN OF CARE
Improve Function, Decrease Pain Keep Dressing Clean, Dry and Intact. May shower on POD#5 with Dressing on. Dressing may be removed on POD#7. Please do not scrub, soak, peel or pick at the dressing. No creams, lotions, or oils over dressing. May shower on POD#5 and let water run over dressing, no baths. Pat dry once out of shower.     If dressing is saturated from border to border. Remove dressing and cover with clean dry dressing.

## 2018-03-05 NOTE — DISCHARGE NOTE ADULT - CARE PLAN
Principal Discharge DX:	S/P lumbar laminectomy  Goal:	Improve Function, Decrease Pain  Assessment and plan of treatment:	Keep Dressing Clean, Dry and Intact. May shower on POD#5 with Dressing on. Dressing may be removed on POD#7. Please do not scrub, soak, peel or pick at the dressing. No creams, lotions, or oils over dressing. May shower on POD#5 and let water run over dressing, no baths. Pat dry once out of shower.     If dressing is saturated from border to border. Remove dressing and cover with clean dry dressing.

## 2018-03-07 DIAGNOSIS — Z23 ENCOUNTER FOR IMMUNIZATION: ICD-10-CM

## 2018-03-07 DIAGNOSIS — M51.16 INTERVERTEBRAL DISC DISORDERS WITH RADICULOPATHY, LUMBAR REGION: ICD-10-CM

## 2020-01-02 PROBLEM — I10 ESSENTIAL (PRIMARY) HYPERTENSION: Chronic | Status: ACTIVE | Noted: 2018-02-16

## 2020-01-03 ENCOUNTER — OUTPATIENT (OUTPATIENT)
Dept: OUTPATIENT SERVICES | Facility: HOSPITAL | Age: 65
LOS: 1 days | Discharge: HOME | End: 2020-01-03
Payer: COMMERCIAL

## 2020-01-03 VITALS
TEMPERATURE: 98 F | HEART RATE: 63 BPM | DIASTOLIC BLOOD PRESSURE: 80 MMHG | RESPIRATION RATE: 16 BRPM | SYSTOLIC BLOOD PRESSURE: 150 MMHG | HEIGHT: 70 IN | OXYGEN SATURATION: 97 % | WEIGHT: 226.64 LBS

## 2020-01-03 DIAGNOSIS — M51.26 OTHER INTERVERTEBRAL DISC DISPLACEMENT, LUMBAR REGION: ICD-10-CM

## 2020-01-03 DIAGNOSIS — K21.9 GASTRO-ESOPHAGEAL REFLUX DISEASE WITHOUT ESOPHAGITIS: ICD-10-CM

## 2020-01-03 DIAGNOSIS — Z98.890 OTHER SPECIFIED POSTPROCEDURAL STATES: Chronic | ICD-10-CM

## 2020-01-03 DIAGNOSIS — Z01.818 ENCOUNTER FOR OTHER PREPROCEDURAL EXAMINATION: ICD-10-CM

## 2020-01-03 DIAGNOSIS — I10 ESSENTIAL (PRIMARY) HYPERTENSION: ICD-10-CM

## 2020-01-03 LAB
ANION GAP SERPL CALC-SCNC: 5 MMOL/L — SIGNIFICANT CHANGE UP (ref 5–17)
APTT BLD: 33.4 SEC — SIGNIFICANT CHANGE UP (ref 27.5–36.3)
BASOPHILS # BLD AUTO: 0.06 K/UL — SIGNIFICANT CHANGE UP (ref 0–0.2)
BASOPHILS NFR BLD AUTO: 1 % — SIGNIFICANT CHANGE UP (ref 0–2)
BLD GP AB SCN SERPL QL: SIGNIFICANT CHANGE UP
BUN SERPL-MCNC: 18 MG/DL — SIGNIFICANT CHANGE UP (ref 7–23)
CALCIUM SERPL-MCNC: 8.7 MG/DL — SIGNIFICANT CHANGE UP (ref 8.5–10.1)
CHLORIDE SERPL-SCNC: 107 MMOL/L — SIGNIFICANT CHANGE UP (ref 96–108)
CO2 SERPL-SCNC: 30 MMOL/L — SIGNIFICANT CHANGE UP (ref 22–31)
CREAT SERPL-MCNC: 1.12 MG/DL — SIGNIFICANT CHANGE UP (ref 0.5–1.3)
EOSINOPHIL # BLD AUTO: 0.37 K/UL — SIGNIFICANT CHANGE UP (ref 0–0.5)
EOSINOPHIL NFR BLD AUTO: 5.9 % — SIGNIFICANT CHANGE UP (ref 0–6)
GLUCOSE SERPL-MCNC: 90 MG/DL — SIGNIFICANT CHANGE UP (ref 70–99)
HBA1C BLD-MCNC: 5.8 % — HIGH (ref 4–5.6)
HCT VFR BLD CALC: 38.5 % — LOW (ref 39–50)
HGB BLD-MCNC: 12.1 G/DL — LOW (ref 13–17)
IMM GRANULOCYTES NFR BLD AUTO: 0.2 % — SIGNIFICANT CHANGE UP (ref 0–1.5)
INR BLD: 0.91 RATIO — SIGNIFICANT CHANGE UP (ref 0.88–1.16)
LYMPHOCYTES # BLD AUTO: 1.55 K/UL — SIGNIFICANT CHANGE UP (ref 1–3.3)
LYMPHOCYTES # BLD AUTO: 24.8 % — SIGNIFICANT CHANGE UP (ref 13–44)
MCHC RBC-ENTMCNC: 26 PG — LOW (ref 27–34)
MCHC RBC-ENTMCNC: 31.4 GM/DL — LOW (ref 32–36)
MCV RBC AUTO: 82.6 FL — SIGNIFICANT CHANGE UP (ref 80–100)
MONOCYTES # BLD AUTO: 0.78 K/UL — SIGNIFICANT CHANGE UP (ref 0–0.9)
MONOCYTES NFR BLD AUTO: 12.5 % — SIGNIFICANT CHANGE UP (ref 2–14)
MRSA PCR RESULT.: SIGNIFICANT CHANGE UP
NEUTROPHILS # BLD AUTO: 3.49 K/UL — SIGNIFICANT CHANGE UP (ref 1.8–7.4)
NEUTROPHILS NFR BLD AUTO: 55.6 % — SIGNIFICANT CHANGE UP (ref 43–77)
NRBC # BLD: 0 /100 WBCS — SIGNIFICANT CHANGE UP (ref 0–0)
PLATELET # BLD AUTO: 253 K/UL — SIGNIFICANT CHANGE UP (ref 150–400)
POTASSIUM SERPL-MCNC: 4.4 MMOL/L — SIGNIFICANT CHANGE UP (ref 3.5–5.3)
POTASSIUM SERPL-SCNC: 4.4 MMOL/L — SIGNIFICANT CHANGE UP (ref 3.5–5.3)
PROTHROM AB SERPL-ACNC: 10.2 SEC — SIGNIFICANT CHANGE UP (ref 10–12.9)
RBC # BLD: 4.66 M/UL — SIGNIFICANT CHANGE UP (ref 4.2–5.8)
RBC # FLD: 14.5 % — SIGNIFICANT CHANGE UP (ref 10.3–14.5)
S AUREUS DNA NOSE QL NAA+PROBE: SIGNIFICANT CHANGE UP
SODIUM SERPL-SCNC: 142 MMOL/L — SIGNIFICANT CHANGE UP (ref 135–145)
WBC # BLD: 6.26 K/UL — SIGNIFICANT CHANGE UP (ref 3.8–10.5)
WBC # FLD AUTO: 6.26 K/UL — SIGNIFICANT CHANGE UP (ref 3.8–10.5)

## 2020-01-03 PROCEDURE — 93010 ELECTROCARDIOGRAM REPORT: CPT

## 2020-01-03 RX ORDER — SODIUM CHLORIDE 9 MG/ML
3 INJECTION INTRAMUSCULAR; INTRAVENOUS; SUBCUTANEOUS EVERY 8 HOURS
Refills: 0 | Status: DISCONTINUED | OUTPATIENT
Start: 2020-01-17 | End: 2020-01-19

## 2020-01-03 NOTE — H&P PST ADULT - NSICDXPROBLEM_GEN_ALL_CORE_FT
PROBLEM DIAGNOSES  Problem: Other intervertebral disc displacement, lumbar region  Assessment and Plan: Pre-op instructions given. Pt verbalized understanding  Chlorhexidine wash instructions given  Pending: Medical clearance - Abnormal EKG + requested by surgeon    Problem: Hypertension  Assessment and Plan: ROMA precaution - stop bang 4  Pt instructed to take meds as prescribed     Problem: GERD (gastroesophageal reflux disease)  Assessment and Plan: Pt instructed to take meds

## 2020-01-03 NOTE — H&P PST ADULT - NEGATIVE GASTROINTESTINAL SYMPTOMS
no change in bowel habits/no melena/no jaundice/no hiccoughs/no nausea/no flatulence/no vomiting/no diarrhea/no constipation/no abdominal pain

## 2020-01-03 NOTE — H&P PST ADULT - HISTORY OF PRESENT ILLNESS
65yo male with medical h/o HTN, GERD and Chronic back pain and presents today for PST for Extreme Lateral Interbody Fusion L2-4 Posterior Spinal Fusion L2-5 Percutaneous Screws L2-5 scheduled for 1/17/2020

## 2020-01-03 NOTE — H&P PST ADULT - NEGATIVE CARDIOVASCULAR SYMPTOMS
no paroxysmal nocturnal dyspnea/no claudication/no orthopnea/no chest pain/no dyspnea on exertion/no palpitations/no peripheral edema

## 2020-01-03 NOTE — H&P PST ADULT - NEGATIVE MUSCULOSKELETAL SYMPTOMS
no stiffness/no neck pain/no arm pain R/no leg pain R/no arthritis/no muscle cramps/no muscle weakness/no leg pain L/no arthralgia/no joint swelling/no myalgia/no arm pain L

## 2020-01-03 NOTE — H&P PST ADULT - NSICDXPASTSURGICALHX_GEN_ALL_CORE_FT
PAST SURGICAL HISTORY:  S/P cervical disc replacement 2017    S/P gastric surgery - Gastric Sleeve ( 4 years ago )    S/P lumbar laminectomy 2 years ago

## 2020-01-16 ENCOUNTER — TRANSCRIPTION ENCOUNTER (OUTPATIENT)
Age: 65
End: 2020-01-16

## 2020-01-17 ENCOUNTER — RESULT REVIEW (OUTPATIENT)
Age: 65
End: 2020-01-17

## 2020-01-17 ENCOUNTER — INPATIENT (INPATIENT)
Facility: HOSPITAL | Age: 65
LOS: 1 days | Discharge: HOME HEALTH | End: 2020-01-19
Attending: ORTHOPAEDIC SURGERY | Admitting: ORTHOPAEDIC SURGERY
Payer: COMMERCIAL

## 2020-01-17 VITALS
RESPIRATION RATE: 17 BRPM | TEMPERATURE: 98 F | HEART RATE: 71 BPM | DIASTOLIC BLOOD PRESSURE: 99 MMHG | HEIGHT: 71 IN | WEIGHT: 220.02 LBS | SYSTOLIC BLOOD PRESSURE: 194 MMHG | OXYGEN SATURATION: 99 %

## 2020-01-17 DIAGNOSIS — Z98.890 OTHER SPECIFIED POSTPROCEDURAL STATES: Chronic | ICD-10-CM

## 2020-01-17 LAB
ANION GAP SERPL CALC-SCNC: 6 MMOL/L — SIGNIFICANT CHANGE UP (ref 5–17)
BASOPHILS # BLD AUTO: 0.04 K/UL — SIGNIFICANT CHANGE UP (ref 0–0.2)
BASOPHILS NFR BLD AUTO: 0.4 % — SIGNIFICANT CHANGE UP (ref 0–2)
BLD GP AB SCN SERPL QL: SIGNIFICANT CHANGE UP
BUN SERPL-MCNC: 20 MG/DL — SIGNIFICANT CHANGE UP (ref 7–23)
CALCIUM SERPL-MCNC: 8.4 MG/DL — LOW (ref 8.5–10.1)
CHLORIDE SERPL-SCNC: 108 MMOL/L — SIGNIFICANT CHANGE UP (ref 96–108)
CO2 SERPL-SCNC: 28 MMOL/L — SIGNIFICANT CHANGE UP (ref 22–31)
CREAT SERPL-MCNC: 1.32 MG/DL — HIGH (ref 0.5–1.3)
EOSINOPHIL # BLD AUTO: 0.37 K/UL — SIGNIFICANT CHANGE UP (ref 0–0.5)
EOSINOPHIL NFR BLD AUTO: 3.7 % — SIGNIFICANT CHANGE UP (ref 0–6)
GLUCOSE SERPL-MCNC: 109 MG/DL — HIGH (ref 70–99)
HCT VFR BLD CALC: 37.5 % — LOW (ref 39–50)
HGB BLD-MCNC: 11.5 G/DL — LOW (ref 13–17)
IMM GRANULOCYTES NFR BLD AUTO: 0.7 % — SIGNIFICANT CHANGE UP (ref 0–1.5)
LYMPHOCYTES # BLD AUTO: 1.94 K/UL — SIGNIFICANT CHANGE UP (ref 1–3.3)
LYMPHOCYTES # BLD AUTO: 19.3 % — SIGNIFICANT CHANGE UP (ref 13–44)
MCHC RBC-ENTMCNC: 25.5 PG — LOW (ref 27–34)
MCHC RBC-ENTMCNC: 30.7 GM/DL — LOW (ref 32–36)
MCV RBC AUTO: 83.1 FL — SIGNIFICANT CHANGE UP (ref 80–100)
MONOCYTES # BLD AUTO: 0.76 K/UL — SIGNIFICANT CHANGE UP (ref 0–0.9)
MONOCYTES NFR BLD AUTO: 7.6 % — SIGNIFICANT CHANGE UP (ref 2–14)
NEUTROPHILS # BLD AUTO: 6.85 K/UL — SIGNIFICANT CHANGE UP (ref 1.8–7.4)
NEUTROPHILS NFR BLD AUTO: 68.3 % — SIGNIFICANT CHANGE UP (ref 43–77)
NRBC # BLD: 0 /100 WBCS — SIGNIFICANT CHANGE UP (ref 0–0)
PLATELET # BLD AUTO: 236 K/UL — SIGNIFICANT CHANGE UP (ref 150–400)
POTASSIUM SERPL-MCNC: 4 MMOL/L — SIGNIFICANT CHANGE UP (ref 3.5–5.3)
POTASSIUM SERPL-SCNC: 4 MMOL/L — SIGNIFICANT CHANGE UP (ref 3.5–5.3)
RBC # BLD: 4.51 M/UL — SIGNIFICANT CHANGE UP (ref 4.2–5.8)
RBC # FLD: 14.1 % — SIGNIFICANT CHANGE UP (ref 10.3–14.5)
SODIUM SERPL-SCNC: 142 MMOL/L — SIGNIFICANT CHANGE UP (ref 135–145)
WBC # BLD: 10.03 K/UL — SIGNIFICANT CHANGE UP (ref 3.8–10.5)
WBC # FLD AUTO: 10.03 K/UL — SIGNIFICANT CHANGE UP (ref 3.8–10.5)

## 2020-01-17 PROCEDURE — 88300 SURGICAL PATH GROSS: CPT | Mod: 26

## 2020-01-17 RX ORDER — FOLIC ACID 0.8 MG
1 TABLET ORAL DAILY
Refills: 0 | Status: DISCONTINUED | OUTPATIENT
Start: 2020-01-17 | End: 2020-01-19

## 2020-01-17 RX ORDER — HYDRALAZINE HCL 50 MG
10 TABLET ORAL ONCE
Refills: 0 | Status: COMPLETED | OUTPATIENT
Start: 2020-01-17 | End: 2020-01-17

## 2020-01-17 RX ORDER — AMLODIPINE BESYLATE 2.5 MG/1
5 TABLET ORAL DAILY
Refills: 0 | Status: DISCONTINUED | OUTPATIENT
Start: 2020-01-17 | End: 2020-01-19

## 2020-01-17 RX ORDER — ONDANSETRON 8 MG/1
4 TABLET, FILM COATED ORAL ONCE
Refills: 0 | Status: DISCONTINUED | OUTPATIENT
Start: 2020-01-17 | End: 2020-01-17

## 2020-01-17 RX ORDER — PANTOPRAZOLE SODIUM 20 MG/1
40 TABLET, DELAYED RELEASE ORAL
Refills: 0 | Status: DISCONTINUED | OUTPATIENT
Start: 2020-01-17 | End: 2020-01-19

## 2020-01-17 RX ORDER — SENNA PLUS 8.6 MG/1
2 TABLET ORAL AT BEDTIME
Refills: 0 | Status: DISCONTINUED | OUTPATIENT
Start: 2020-01-17 | End: 2020-01-19

## 2020-01-17 RX ORDER — SODIUM CHLORIDE 9 MG/ML
1000 INJECTION, SOLUTION INTRAVENOUS
Refills: 0 | Status: DISCONTINUED | OUTPATIENT
Start: 2020-01-17 | End: 2020-01-18

## 2020-01-17 RX ORDER — CYCLOBENZAPRINE HYDROCHLORIDE 10 MG/1
10 TABLET, FILM COATED ORAL EVERY 8 HOURS
Refills: 0 | Status: DISCONTINUED | OUTPATIENT
Start: 2020-01-17 | End: 2020-01-19

## 2020-01-17 RX ORDER — CYCLOBENZAPRINE HYDROCHLORIDE 10 MG/1
10 TABLET, FILM COATED ORAL ONCE
Refills: 0 | Status: COMPLETED | OUTPATIENT
Start: 2020-01-17 | End: 2020-01-17

## 2020-01-17 RX ORDER — CEFAZOLIN SODIUM 1 G
2000 VIAL (EA) INJECTION EVERY 8 HOURS
Refills: 0 | Status: COMPLETED | OUTPATIENT
Start: 2020-01-17 | End: 2020-01-17

## 2020-01-17 RX ORDER — DIPHENHYDRAMINE HCL 50 MG
12.5 CAPSULE ORAL EVERY 4 HOURS
Refills: 0 | Status: DISCONTINUED | OUTPATIENT
Start: 2020-01-17 | End: 2020-01-19

## 2020-01-17 RX ORDER — METOCLOPRAMIDE HCL 10 MG
10 TABLET ORAL ONCE
Refills: 0 | Status: COMPLETED | OUTPATIENT
Start: 2020-01-17 | End: 2020-01-18

## 2020-01-17 RX ORDER — HYDROMORPHONE HYDROCHLORIDE 2 MG/ML
0.5 INJECTION INTRAMUSCULAR; INTRAVENOUS; SUBCUTANEOUS
Refills: 0 | Status: DISCONTINUED | OUTPATIENT
Start: 2020-01-17 | End: 2020-01-17

## 2020-01-17 RX ORDER — HYDROMORPHONE HYDROCHLORIDE 2 MG/ML
1 INJECTION INTRAMUSCULAR; INTRAVENOUS; SUBCUTANEOUS
Refills: 0 | Status: DISCONTINUED | OUTPATIENT
Start: 2020-01-17 | End: 2020-01-17

## 2020-01-17 RX ORDER — HYDROMORPHONE HYDROCHLORIDE 2 MG/ML
2 INJECTION INTRAMUSCULAR; INTRAVENOUS; SUBCUTANEOUS EVERY 4 HOURS
Refills: 0 | Status: DISCONTINUED | OUTPATIENT
Start: 2020-01-17 | End: 2020-01-18

## 2020-01-17 RX ORDER — ACETAMINOPHEN 500 MG
1000 TABLET ORAL ONCE
Refills: 0 | Status: COMPLETED | OUTPATIENT
Start: 2020-01-17 | End: 2020-01-17

## 2020-01-17 RX ORDER — HYDROMORPHONE HYDROCHLORIDE 2 MG/ML
4 INJECTION INTRAMUSCULAR; INTRAVENOUS; SUBCUTANEOUS EVERY 4 HOURS
Refills: 0 | Status: DISCONTINUED | OUTPATIENT
Start: 2020-01-17 | End: 2020-01-19

## 2020-01-17 RX ORDER — LOSARTAN POTASSIUM 100 MG/1
100 TABLET, FILM COATED ORAL DAILY
Refills: 0 | Status: DISCONTINUED | OUTPATIENT
Start: 2020-01-17 | End: 2020-01-19

## 2020-01-17 RX ORDER — HYDROMORPHONE HYDROCHLORIDE 2 MG/ML
0.5 INJECTION INTRAMUSCULAR; INTRAVENOUS; SUBCUTANEOUS EVERY 4 HOURS
Refills: 0 | Status: DISCONTINUED | OUTPATIENT
Start: 2020-01-17 | End: 2020-01-17

## 2020-01-17 RX ORDER — HYDROMORPHONE HYDROCHLORIDE 2 MG/ML
1 INJECTION INTRAMUSCULAR; INTRAVENOUS; SUBCUTANEOUS EVERY 4 HOURS
Refills: 0 | Status: DISCONTINUED | OUTPATIENT
Start: 2020-01-17 | End: 2020-01-19

## 2020-01-17 RX ADMIN — HYDROMORPHONE HYDROCHLORIDE 1 MILLIGRAM(S): 2 INJECTION INTRAMUSCULAR; INTRAVENOUS; SUBCUTANEOUS at 16:37

## 2020-01-17 RX ADMIN — HYDROMORPHONE HYDROCHLORIDE 1 MILLIGRAM(S): 2 INJECTION INTRAMUSCULAR; INTRAVENOUS; SUBCUTANEOUS at 12:50

## 2020-01-17 RX ADMIN — HYDROMORPHONE HYDROCHLORIDE 1 MILLIGRAM(S): 2 INJECTION INTRAMUSCULAR; INTRAVENOUS; SUBCUTANEOUS at 13:07

## 2020-01-17 RX ADMIN — SODIUM CHLORIDE 3 MILLILITER(S): 9 INJECTION INTRAMUSCULAR; INTRAVENOUS; SUBCUTANEOUS at 22:27

## 2020-01-17 RX ADMIN — HYDROMORPHONE HYDROCHLORIDE 1 MILLIGRAM(S): 2 INJECTION INTRAMUSCULAR; INTRAVENOUS; SUBCUTANEOUS at 13:55

## 2020-01-17 RX ADMIN — HYDROMORPHONE HYDROCHLORIDE 4 MILLIGRAM(S): 2 INJECTION INTRAMUSCULAR; INTRAVENOUS; SUBCUTANEOUS at 15:11

## 2020-01-17 RX ADMIN — HYDROMORPHONE HYDROCHLORIDE 4 MILLIGRAM(S): 2 INJECTION INTRAMUSCULAR; INTRAVENOUS; SUBCUTANEOUS at 19:40

## 2020-01-17 RX ADMIN — HYDROMORPHONE HYDROCHLORIDE 1 MILLIGRAM(S): 2 INJECTION INTRAMUSCULAR; INTRAVENOUS; SUBCUTANEOUS at 21:25

## 2020-01-17 RX ADMIN — Medication 1000 MILLIGRAM(S): at 13:25

## 2020-01-17 RX ADMIN — HYDROMORPHONE HYDROCHLORIDE 4 MILLIGRAM(S): 2 INJECTION INTRAMUSCULAR; INTRAVENOUS; SUBCUTANEOUS at 20:02

## 2020-01-17 RX ADMIN — HYDROMORPHONE HYDROCHLORIDE 1 MILLIGRAM(S): 2 INJECTION INTRAMUSCULAR; INTRAVENOUS; SUBCUTANEOUS at 17:37

## 2020-01-17 RX ADMIN — HYDROMORPHONE HYDROCHLORIDE 1 MILLIGRAM(S): 2 INJECTION INTRAMUSCULAR; INTRAVENOUS; SUBCUTANEOUS at 12:35

## 2020-01-17 RX ADMIN — SENNA PLUS 2 TABLET(S): 8.6 TABLET ORAL at 21:13

## 2020-01-17 RX ADMIN — HYDROMORPHONE HYDROCHLORIDE 1 MILLIGRAM(S): 2 INJECTION INTRAMUSCULAR; INTRAVENOUS; SUBCUTANEOUS at 13:30

## 2020-01-17 RX ADMIN — SODIUM CHLORIDE 125 MILLILITER(S): 9 INJECTION, SOLUTION INTRAVENOUS at 15:13

## 2020-01-17 RX ADMIN — Medication 400 MILLIGRAM(S): at 12:30

## 2020-01-17 RX ADMIN — Medication 100 MILLIGRAM(S): at 20:11

## 2020-01-17 RX ADMIN — Medication 75 MILLIGRAM(S): at 18:40

## 2020-01-17 RX ADMIN — HYDROMORPHONE HYDROCHLORIDE 1 MILLIGRAM(S): 2 INJECTION INTRAMUSCULAR; INTRAVENOUS; SUBCUTANEOUS at 21:13

## 2020-01-17 RX ADMIN — CYCLOBENZAPRINE HYDROCHLORIDE 10 MILLIGRAM(S): 10 TABLET, FILM COATED ORAL at 15:44

## 2020-01-17 RX ADMIN — HYDROMORPHONE HYDROCHLORIDE 1 MILLIGRAM(S): 2 INJECTION INTRAMUSCULAR; INTRAVENOUS; SUBCUTANEOUS at 13:25

## 2020-01-17 RX ADMIN — HYDROMORPHONE HYDROCHLORIDE 4 MILLIGRAM(S): 2 INJECTION INTRAMUSCULAR; INTRAVENOUS; SUBCUTANEOUS at 16:11

## 2020-01-17 NOTE — PHYSICAL THERAPY INITIAL EVALUATION ADULT - GAIT TRAINING, PT EVAL
Independent in ambulation with use of cane device up to 200 feet observing proper gait pattern, posture and use of walking device safely.

## 2020-01-17 NOTE — PROGRESS NOTE ADULT - SUBJECTIVE AND OBJECTIVE BOX
Post-Op Check    64y Male s/p XLIF/PSF L2-5 POD#0. Pt seen and examined in NAD. Pain controlled. Pre-op LLE pain is improved. Pt denies any new complaints. Pt denies CP/SOB/N/V/D/numbness/tingling/bowel or bladder dysfunction.     PE:     Spine: Dressing c/d/i  B/L LE: Skin intact. +ROM hip/knee/ankle/toes. Ankle Dorsi/plantarflexion: 5/5. Calf: soft, compressible and nontender. DP/PT 2+ NVI.                              11.2   9.48  )-----------( 236      ( 19 Jan 2020 06:55 )             36.1       01-19    139  |  102  |  15  ----------------------------<  100<H>  3.9   |  32<H>  |  0.99    Ca    8.6      19 Jan 2020 06:55          A/P: 64y Male s/p 64y Male s/p XLIF/PSF L2-5 POD#0.      Pain controlled  PT: Out of bed as tolerated - spinal precautions   DVT ppx: SCDs   Wound care, Isometric exercises, incentive spirometry   Discharge: planning home vs. rehab  All the above discussed and understood by pt

## 2020-01-17 NOTE — PHYSICAL THERAPY INITIAL EVALUATION ADULT - ACTIVE RANGE OF MOTION EXAMINATION, REHAB EVAL
Left LE Active ROM was WFL (within functional limits)/deficits as listed below/Right UE Active ROM was WFL (within functional limits)/Trunk: no bending, twisting and lifting/no Active ROM deficits were identified/Left UE Active ROM was WFL (within functional limits)/Right LE Active ROM was WFL (within functional limits)

## 2020-01-17 NOTE — PHYSICAL THERAPY INITIAL EVALUATION ADULT - CRITERIA FOR SKILLED THERAPEUTIC INTERVENTIONS
functional limitations in following categories/risk reduction/prevention/therapy frequency/anticipated discharge recommendation/impairments found/rehab potential/predicted duration of therapy intervention/home with home PT

## 2020-01-17 NOTE — PHYSICAL THERAPY INITIAL EVALUATION ADULT - ADDITIONAL COMMENTS
as per patient, he lives in a private home with his wife, one of his sons (26 y/o), his mother, who has a HHA 12 hours/7 days. he does not have steps to negotiate to house, has one step down towards the kitchen. walking shower. pt was independent.

## 2020-01-17 NOTE — PHYSICAL THERAPY INITIAL EVALUATION ADULT - RANGE OF MOTION EXAMINATION, REHAB EVAL
Right LE ROM was WFL (within functional limits)/Left LE ROM was WFL (within functional limits)/Left UE ROM was WFL (within functional limits)/Right UE ROM was WFL (within functional limits)/deficits as listed below/Trunk: no bending, twisting and lifting.

## 2020-01-17 NOTE — PHYSICAL THERAPY INITIAL EVALUATION ADULT - STRENGTHENING, PT EVAL
Improve strength in the B UE/LE's and be able to perform functional tasks-bed mobility, sitting, standing, transfers and ambulate in a safe manner with or without  assistive device and prevent falls.

## 2020-01-18 ENCOUNTER — TRANSCRIPTION ENCOUNTER (OUTPATIENT)
Age: 65
End: 2020-01-18

## 2020-01-18 LAB
ANION GAP SERPL CALC-SCNC: 4 MMOL/L — LOW (ref 5–17)
BASOPHILS # BLD AUTO: 0.01 K/UL — SIGNIFICANT CHANGE UP (ref 0–0.2)
BASOPHILS NFR BLD AUTO: 0.1 % — SIGNIFICANT CHANGE UP (ref 0–2)
BUN SERPL-MCNC: 16 MG/DL — SIGNIFICANT CHANGE UP (ref 7–23)
CALCIUM SERPL-MCNC: 8.6 MG/DL — SIGNIFICANT CHANGE UP (ref 8.5–10.1)
CHLORIDE SERPL-SCNC: 105 MMOL/L — SIGNIFICANT CHANGE UP (ref 96–108)
CO2 SERPL-SCNC: 29 MMOL/L — SIGNIFICANT CHANGE UP (ref 22–31)
CREAT SERPL-MCNC: 0.92 MG/DL — SIGNIFICANT CHANGE UP (ref 0.5–1.3)
EOSINOPHIL # BLD AUTO: 0 K/UL — SIGNIFICANT CHANGE UP (ref 0–0.5)
EOSINOPHIL NFR BLD AUTO: 0 % — SIGNIFICANT CHANGE UP (ref 0–6)
GLUCOSE SERPL-MCNC: 126 MG/DL — HIGH (ref 70–99)
HCT VFR BLD CALC: 37.2 % — LOW (ref 39–50)
HGB BLD-MCNC: 11.7 G/DL — LOW (ref 13–17)
IMM GRANULOCYTES NFR BLD AUTO: 0.6 % — SIGNIFICANT CHANGE UP (ref 0–1.5)
LYMPHOCYTES # BLD AUTO: 1.09 K/UL — SIGNIFICANT CHANGE UP (ref 1–3.3)
LYMPHOCYTES # BLD AUTO: 8 % — LOW (ref 13–44)
MCHC RBC-ENTMCNC: 25.6 PG — LOW (ref 27–34)
MCHC RBC-ENTMCNC: 31.5 GM/DL — LOW (ref 32–36)
MCV RBC AUTO: 81.4 FL — SIGNIFICANT CHANGE UP (ref 80–100)
MONOCYTES # BLD AUTO: 1.31 K/UL — HIGH (ref 0–0.9)
MONOCYTES NFR BLD AUTO: 9.6 % — SIGNIFICANT CHANGE UP (ref 2–14)
NEUTROPHILS # BLD AUTO: 11.2 K/UL — HIGH (ref 1.8–7.4)
NEUTROPHILS NFR BLD AUTO: 81.7 % — HIGH (ref 43–77)
NRBC # BLD: 0 /100 WBCS — SIGNIFICANT CHANGE UP (ref 0–0)
PLATELET # BLD AUTO: 262 K/UL — SIGNIFICANT CHANGE UP (ref 150–400)
POTASSIUM SERPL-MCNC: 4.5 MMOL/L — SIGNIFICANT CHANGE UP (ref 3.5–5.3)
POTASSIUM SERPL-SCNC: 4.5 MMOL/L — SIGNIFICANT CHANGE UP (ref 3.5–5.3)
RBC # BLD: 4.57 M/UL — SIGNIFICANT CHANGE UP (ref 4.2–5.8)
RBC # FLD: 14.2 % — SIGNIFICANT CHANGE UP (ref 10.3–14.5)
SODIUM SERPL-SCNC: 138 MMOL/L — SIGNIFICANT CHANGE UP (ref 135–145)
WBC # BLD: 13.69 K/UL — HIGH (ref 3.8–10.5)
WBC # FLD AUTO: 13.69 K/UL — HIGH (ref 3.8–10.5)

## 2020-01-18 RX ORDER — OXYCODONE HYDROCHLORIDE 5 MG/1
10 TABLET ORAL EVERY 4 HOURS
Refills: 0 | Status: DISCONTINUED | OUTPATIENT
Start: 2020-01-18 | End: 2020-01-19

## 2020-01-18 RX ORDER — ACETAMINOPHEN 500 MG
1000 TABLET ORAL ONCE
Refills: 0 | Status: COMPLETED | OUTPATIENT
Start: 2020-01-18 | End: 2020-01-18

## 2020-01-18 RX ORDER — ACETAMINOPHEN 500 MG
1000 TABLET ORAL ONCE
Refills: 0 | Status: DISCONTINUED | OUTPATIENT
Start: 2020-01-18 | End: 2020-01-19

## 2020-01-18 RX ORDER — POLYETHYLENE GLYCOL 3350 17 G/17G
17 POWDER, FOR SOLUTION ORAL ONCE
Refills: 0 | Status: DISCONTINUED | OUTPATIENT
Start: 2020-01-18 | End: 2020-01-19

## 2020-01-18 RX ADMIN — HYDROMORPHONE HYDROCHLORIDE 4 MILLIGRAM(S): 2 INJECTION INTRAMUSCULAR; INTRAVENOUS; SUBCUTANEOUS at 18:19

## 2020-01-18 RX ADMIN — HYDROMORPHONE HYDROCHLORIDE 4 MILLIGRAM(S): 2 INJECTION INTRAMUSCULAR; INTRAVENOUS; SUBCUTANEOUS at 06:28

## 2020-01-18 RX ADMIN — HYDROMORPHONE HYDROCHLORIDE 4 MILLIGRAM(S): 2 INJECTION INTRAMUSCULAR; INTRAVENOUS; SUBCUTANEOUS at 01:05

## 2020-01-18 RX ADMIN — OXYCODONE HYDROCHLORIDE 10 MILLIGRAM(S): 5 TABLET ORAL at 22:11

## 2020-01-18 RX ADMIN — Medication 1000 MILLIGRAM(S): at 19:45

## 2020-01-18 RX ADMIN — Medication 1000 MILLIGRAM(S): at 16:40

## 2020-01-18 RX ADMIN — Medication 10 MILLIGRAM(S): at 08:09

## 2020-01-18 RX ADMIN — HYDROMORPHONE HYDROCHLORIDE 4 MILLIGRAM(S): 2 INJECTION INTRAMUSCULAR; INTRAVENOUS; SUBCUTANEOUS at 11:00

## 2020-01-18 RX ADMIN — HYDROMORPHONE HYDROCHLORIDE 1 MILLIGRAM(S): 2 INJECTION INTRAMUSCULAR; INTRAVENOUS; SUBCUTANEOUS at 08:45

## 2020-01-18 RX ADMIN — HYDROMORPHONE HYDROCHLORIDE 1 MILLIGRAM(S): 2 INJECTION INTRAMUSCULAR; INTRAVENOUS; SUBCUTANEOUS at 03:50

## 2020-01-18 RX ADMIN — SODIUM CHLORIDE 3 MILLILITER(S): 9 INJECTION INTRAMUSCULAR; INTRAVENOUS; SUBCUTANEOUS at 13:58

## 2020-01-18 RX ADMIN — HYDROMORPHONE HYDROCHLORIDE 4 MILLIGRAM(S): 2 INJECTION INTRAMUSCULAR; INTRAVENOUS; SUBCUTANEOUS at 06:58

## 2020-01-18 RX ADMIN — SENNA PLUS 2 TABLET(S): 8.6 TABLET ORAL at 21:48

## 2020-01-18 RX ADMIN — OXYCODONE HYDROCHLORIDE 10 MILLIGRAM(S): 5 TABLET ORAL at 23:11

## 2020-01-18 RX ADMIN — SODIUM CHLORIDE 3 MILLILITER(S): 9 INJECTION INTRAMUSCULAR; INTRAVENOUS; SUBCUTANEOUS at 05:59

## 2020-01-18 RX ADMIN — LOSARTAN POTASSIUM 100 MILLIGRAM(S): 100 TABLET, FILM COATED ORAL at 06:28

## 2020-01-18 RX ADMIN — AMLODIPINE BESYLATE 5 MILLIGRAM(S): 2.5 TABLET ORAL at 05:58

## 2020-01-18 RX ADMIN — CYCLOBENZAPRINE HYDROCHLORIDE 10 MILLIGRAM(S): 10 TABLET, FILM COATED ORAL at 21:48

## 2020-01-18 RX ADMIN — Medication 1000 MILLIGRAM(S): at 02:50

## 2020-01-18 RX ADMIN — Medication 400 MILLIGRAM(S): at 18:38

## 2020-01-18 RX ADMIN — Medication 400 MILLIGRAM(S): at 14:59

## 2020-01-18 RX ADMIN — HYDROMORPHONE HYDROCHLORIDE 4 MILLIGRAM(S): 2 INJECTION INTRAMUSCULAR; INTRAVENOUS; SUBCUTANEOUS at 02:05

## 2020-01-18 RX ADMIN — CYCLOBENZAPRINE HYDROCHLORIDE 10 MILLIGRAM(S): 10 TABLET, FILM COATED ORAL at 14:59

## 2020-01-18 RX ADMIN — CYCLOBENZAPRINE HYDROCHLORIDE 10 MILLIGRAM(S): 10 TABLET, FILM COATED ORAL at 05:58

## 2020-01-18 RX ADMIN — SODIUM CHLORIDE 3 MILLILITER(S): 9 INJECTION INTRAMUSCULAR; INTRAVENOUS; SUBCUTANEOUS at 22:10

## 2020-01-18 RX ADMIN — HYDROMORPHONE HYDROCHLORIDE 1 MILLIGRAM(S): 2 INJECTION INTRAMUSCULAR; INTRAVENOUS; SUBCUTANEOUS at 03:41

## 2020-01-18 RX ADMIN — HYDROMORPHONE HYDROCHLORIDE 1 MILLIGRAM(S): 2 INJECTION INTRAMUSCULAR; INTRAVENOUS; SUBCUTANEOUS at 14:22

## 2020-01-18 RX ADMIN — Medication 75 MILLIGRAM(S): at 17:17

## 2020-01-18 RX ADMIN — HYDROMORPHONE HYDROCHLORIDE 1 MILLIGRAM(S): 2 INJECTION INTRAMUSCULAR; INTRAVENOUS; SUBCUTANEOUS at 08:09

## 2020-01-18 RX ADMIN — HYDROMORPHONE HYDROCHLORIDE 4 MILLIGRAM(S): 2 INJECTION INTRAMUSCULAR; INTRAVENOUS; SUBCUTANEOUS at 10:06

## 2020-01-18 RX ADMIN — HYDROMORPHONE HYDROCHLORIDE 1 MILLIGRAM(S): 2 INJECTION INTRAMUSCULAR; INTRAVENOUS; SUBCUTANEOUS at 13:50

## 2020-01-18 RX ADMIN — Medication 400 MILLIGRAM(S): at 02:30

## 2020-01-18 RX ADMIN — PANTOPRAZOLE SODIUM 40 MILLIGRAM(S): 20 TABLET, DELAYED RELEASE ORAL at 05:59

## 2020-01-18 RX ADMIN — HYDROMORPHONE HYDROCHLORIDE 1 MILLIGRAM(S): 2 INJECTION INTRAMUSCULAR; INTRAVENOUS; SUBCUTANEOUS at 20:00

## 2020-01-18 RX ADMIN — HYDROMORPHONE HYDROCHLORIDE 4 MILLIGRAM(S): 2 INJECTION INTRAMUSCULAR; INTRAVENOUS; SUBCUTANEOUS at 17:16

## 2020-01-18 RX ADMIN — Medication 75 MILLIGRAM(S): at 05:58

## 2020-01-18 RX ADMIN — HYDROMORPHONE HYDROCHLORIDE 1 MILLIGRAM(S): 2 INJECTION INTRAMUSCULAR; INTRAVENOUS; SUBCUTANEOUS at 19:45

## 2020-01-18 NOTE — PROGRESS NOTE ADULT - SUBJECTIVE AND OBJECTIVE BOX
POD#1 S/P XLIF/PSF L2-L5  64yMale Patient seen and examined, Pain controlled  Patient Denies SOB, CP, N/V/D   Ambulating with PT    PE: L-Spine: Dressing C/D/I, Sensation/motor intact   B/L UE: Skin intact. +ROM shoulder/elbow/wrist/fingers. +ok/thumbsup/fingercross signs.  strength: 5/5.  RP2+ NVI.   B/L LE: Skin intact. +ROM hip/knee/ankle/toes. Ankle Dorsi/plantarflexion: 5/5. Calf: soft, compressible and nontender. DP/PT 2+ NVI                          11.7   13.69 )-----------( 262      ( 18 Jan 2020 06:30 )             37.2       01-18    138  |  105  |  16  ----------------------------<  126<H>  4.5   |  29  |  0.92    Ca    8.6      18 Jan 2020 06:30          A: As above   P: Pain Control       DVT Prophylaxis      Incentive spirometry      PT WBAT      Isometric exercises      Discharge Planning      All the above discussed and understood by pt       Ortho to F/U

## 2020-01-18 NOTE — DISCHARGE NOTE PROVIDER - NSDCMRMEDTOKEN_GEN_ALL_CORE_FT
amLODIPine 5 mg oral tablet: 1 tab(s) orally once a day  lansoprazole 15 mg oral delayed release capsule: 1 cap(s) orally once a day  losartan 100 mg oral tablet: 1 tab(s) orally once a day  Lyrica 75 mg oral capsule: 1 cap(s) orally 2 times a day  oxyCODONE 15 mg oral tablet: 1 tab(s) orally 2 times a day  tadalafil 5 mg oral tablet: 1 tab(s) orally once a day acetaminophen 500 mg oral capsule: 2 cap(s) orally every 6 hours  amLODIPine 5 mg oral tablet: 1 tab(s) orally once a day  cyclobenzaprine 10 mg oral tablet: 1 tab(s) orally every 8 hours MDD:3  lansoprazole 15 mg oral delayed release capsule: 1 cap(s) orally once a day  losartan 100 mg oral tablet: 1 tab(s) orally once a day  Lyrica 75 mg oral capsule: 1 cap(s) orally 2 times a day  Multiple Vitamins oral tablet: 1 tab(s) orally once a day  oxyCODONE 15 mg oral tablet: 1 tab(s) orally every 3-4 hours, As Needed -Severe Pain (7 - 10) MDD:8  senna oral tablet: 2 tab(s) orally once a day (at bedtime)  tadalafil 5 mg oral tablet: 1 tab(s) orally once a day

## 2020-01-18 NOTE — DISCHARGE NOTE NURSING/CASE MANAGEMENT/SOCIAL WORK - NSDCPNDISPN_GEN_ALL_CORE
Side effects of pain management treatment/Opioids not applicable/not prescribed/Safe use, storage and disposal of opioids when prescribed/Education provided on the pain management plan of care/Activities of daily living, including home environment that might     exacerbate pain or reduce effectiveness of the pain management plan of care as well as strategies to address these issues

## 2020-01-18 NOTE — DISCHARGE NOTE PROVIDER - CARE PROVIDER_API CALL
Juan Pulliam)  Orthopaedic Surgery  [address] [place] [address]  Phone: [phone]  Fax: [phone]  Follow Up Time:

## 2020-01-18 NOTE — DISCHARGE NOTE NURSING/CASE MANAGEMENT/SOCIAL WORK - NSDCPNINST_GEN_ALL_CORE
Take your medications exactly as prescribed. Having your pain under control will help increase activity, improve deep breathing and coughing and prevent complications like pneumonia and blood clots in your legs. Some of the common side effects of pain medications are nausea, vomiting, itching, rash and upset stomach. Contact your doctor if you develop these or any other unusual symptoms.   Eat a diet rich in fiber and drink plenty of oral fluids.   Use other pain management methods like, cold and warm applications, elevation of affected body part, listening to music, watching TV, yoga, etc.   Do not take any other medications unless approved by your doctor.   Do not drive, operate machinery, drink alcohol, or make any important decisions while taking narcotic pain medications.   Store medication in its original bottle, in a locked cabinet away from the reach of children.   Dispose of any unused and  medication safely.

## 2020-01-18 NOTE — DISCHARGE NOTE PROVIDER - NSDCFUADDAPPT_GEN_ALL_CORE_FT
Follow up with your surgeon in two weeks. Call for appointment.  If you need more pain medication, call your surgeon's office.  We Recommend a follow up appointment with your primary care physician for repeat blood work (CBC and BMP) for post hospital discharge follow-up care.  Call your surgeon if you have increased redness/pain/drainage or fever. Return to ER for shortness of breath/calf tenderness.

## 2020-01-18 NOTE — DISCHARGE NOTE PROVIDER - NSDCFUADDINST_GEN_ALL_CORE_FT
Keep Dressing Clean, Dry and Intact. May shower on POD#5 with Dressing on. Dressing may be removed on POD#7. Please do not scrub, soak, peel or pick at the dressing. No creams, lotions, or oils over dressing. May shower on POD#5 and let water run over dressing, no baths. Pat dry once out of shower.     If dressing is saturated from border to border. Remove dressing and cover with clean dry dressing. Keep Dressing Clean, Dry and Intact. May shower on POD#5 with Dressing on. Dressing may be removed on POD#7. Please do not scrub, soak, peel or pick at the dressing. No creams, lotions, or oils over dressing. May shower on POD#5 and let water run over dressing, no baths. Pat dry once out of shower.     If dressing is saturated from border to border. Remove dressing and cover with clean dry dressing.    No bending/lifting/twisting/pulling/pushing/carrying or driving. No blood thinners (not limited to but including) aspirin, motrin, alleve, naproxen, etc.

## 2020-01-18 NOTE — DISCHARGE NOTE NURSING/CASE MANAGEMENT/SOCIAL WORK - PATIENT PORTAL LINK FT
You can access the FollowMyHealth Patient Portal offered by [facility] by registering at the following website: [email or web address]. By joining [employer]’s FollowMyHealth portal, you will also be able to view your health information using other applications (apps) compatible with our system.

## 2020-01-18 NOTE — DISCHARGE NOTE PROVIDER - HOSPITAL COURSE
64yMale with history of Lumbar Radiculopathy presenting for XLIF/PSF L2-L5 by Dr. Pulliam on 1/17/20. Risk and benefits of surgery were explained to the patient. The patient understood and agreed to proceed with surgery. Patient underwent the procedure with no intraoperative complications. Pt was brought in stable condition to the PACU. Once stable in PACU, pt was brought to the floor. During hospital stay pt was followed by Medicine during this admission. Pt had an uneventful hospital course. Pt is stable for discharge to [rehab/home] on POD#[ ] 64yMale with history of Lumbar Radiculopathy presenting for XLIF/PSF L2-L5 by Dr. Pulliam on 1/17/20. Risk and benefits of surgery were explained to the patient. The patient understood and agreed to proceed with surgery. Patient underwent the procedure with no intraoperative complications. Pt was brought in stable condition to the PACU. Once stable in PACU, pt was brought to the floor. During hospital stay pt was followed by Medicine during this admission. Pt had an uneventful hospital course. Pt is stable for discharge to home on POD#2

## 2020-01-19 VITALS — WEIGHT: 299.83 LBS

## 2020-01-19 LAB
ANION GAP SERPL CALC-SCNC: 5 MMOL/L — SIGNIFICANT CHANGE UP (ref 5–17)
BASOPHILS # BLD AUTO: 0.03 K/UL — SIGNIFICANT CHANGE UP (ref 0–0.2)
BASOPHILS NFR BLD AUTO: 0.3 % — SIGNIFICANT CHANGE UP (ref 0–2)
BUN SERPL-MCNC: 15 MG/DL — SIGNIFICANT CHANGE UP (ref 7–23)
CALCIUM SERPL-MCNC: 8.6 MG/DL — SIGNIFICANT CHANGE UP (ref 8.5–10.1)
CHLORIDE SERPL-SCNC: 102 MMOL/L — SIGNIFICANT CHANGE UP (ref 96–108)
CO2 SERPL-SCNC: 32 MMOL/L — HIGH (ref 22–31)
CREAT SERPL-MCNC: 0.99 MG/DL — SIGNIFICANT CHANGE UP (ref 0.5–1.3)
EOSINOPHIL # BLD AUTO: 0.11 K/UL — SIGNIFICANT CHANGE UP (ref 0–0.5)
EOSINOPHIL NFR BLD AUTO: 1.2 % — SIGNIFICANT CHANGE UP (ref 0–6)
GLUCOSE SERPL-MCNC: 100 MG/DL — HIGH (ref 70–99)
HCT VFR BLD CALC: 36.1 % — LOW (ref 39–50)
HGB BLD-MCNC: 11.2 G/DL — LOW (ref 13–17)
IMM GRANULOCYTES NFR BLD AUTO: 0.5 % — SIGNIFICANT CHANGE UP (ref 0–1.5)
LYMPHOCYTES # BLD AUTO: 1.31 K/UL — SIGNIFICANT CHANGE UP (ref 1–3.3)
LYMPHOCYTES # BLD AUTO: 13.8 % — SIGNIFICANT CHANGE UP (ref 13–44)
MCHC RBC-ENTMCNC: 25.5 PG — LOW (ref 27–34)
MCHC RBC-ENTMCNC: 31 GM/DL — LOW (ref 32–36)
MCV RBC AUTO: 82.2 FL — SIGNIFICANT CHANGE UP (ref 80–100)
MONOCYTES # BLD AUTO: 1.24 K/UL — HIGH (ref 0–0.9)
MONOCYTES NFR BLD AUTO: 13.1 % — SIGNIFICANT CHANGE UP (ref 2–14)
NEUTROPHILS # BLD AUTO: 6.74 K/UL — SIGNIFICANT CHANGE UP (ref 1.8–7.4)
NEUTROPHILS NFR BLD AUTO: 71.1 % — SIGNIFICANT CHANGE UP (ref 43–77)
NRBC # BLD: 0 /100 WBCS — SIGNIFICANT CHANGE UP (ref 0–0)
PLATELET # BLD AUTO: 236 K/UL — SIGNIFICANT CHANGE UP (ref 150–400)
POTASSIUM SERPL-MCNC: 3.9 MMOL/L — SIGNIFICANT CHANGE UP (ref 3.5–5.3)
POTASSIUM SERPL-SCNC: 3.9 MMOL/L — SIGNIFICANT CHANGE UP (ref 3.5–5.3)
RBC # BLD: 4.39 M/UL — SIGNIFICANT CHANGE UP (ref 4.2–5.8)
RBC # FLD: 14.5 % — SIGNIFICANT CHANGE UP (ref 10.3–14.5)
SODIUM SERPL-SCNC: 139 MMOL/L — SIGNIFICANT CHANGE UP (ref 135–145)
WBC # BLD: 9.48 K/UL — SIGNIFICANT CHANGE UP (ref 3.8–10.5)
WBC # FLD AUTO: 9.48 K/UL — SIGNIFICANT CHANGE UP (ref 3.8–10.5)

## 2020-01-19 RX ORDER — OXYCODONE HYDROCHLORIDE 5 MG/1
15 TABLET ORAL EVERY 4 HOURS
Refills: 0 | Status: DISCONTINUED | OUTPATIENT
Start: 2020-01-19 | End: 2020-01-19

## 2020-01-19 RX ORDER — LACTULOSE 10 G/15ML
20 SOLUTION ORAL ONCE
Refills: 0 | Status: COMPLETED | OUTPATIENT
Start: 2020-01-19 | End: 2020-01-19

## 2020-01-19 RX ORDER — OXYCODONE HYDROCHLORIDE 5 MG/1
5 TABLET ORAL ONCE
Refills: 0 | Status: DISCONTINUED | OUTPATIENT
Start: 2020-01-19 | End: 2020-01-19

## 2020-01-19 RX ORDER — OXYCODONE HYDROCHLORIDE 5 MG/1
1 TABLET ORAL
Qty: 40 | Refills: 0
Start: 2020-01-19 | End: 2020-01-23

## 2020-01-19 RX ORDER — OXYCODONE HYDROCHLORIDE 5 MG/1
1 TABLET ORAL
Qty: 0 | Refills: 0 | DISCHARGE

## 2020-01-19 RX ORDER — CYCLOBENZAPRINE HYDROCHLORIDE 10 MG/1
1 TABLET, FILM COATED ORAL
Qty: 21 | Refills: 0
Start: 2020-01-19 | End: 2020-01-25

## 2020-01-19 RX ORDER — ACETAMINOPHEN 500 MG
2 TABLET ORAL
Qty: 0 | Refills: 0 | DISCHARGE
Start: 2020-01-19

## 2020-01-19 RX ORDER — SENNA PLUS 8.6 MG/1
2 TABLET ORAL
Qty: 0 | Refills: 0 | DISCHARGE
Start: 2020-01-19

## 2020-01-19 RX ADMIN — OXYCODONE HYDROCHLORIDE 10 MILLIGRAM(S): 5 TABLET ORAL at 02:14

## 2020-01-19 RX ADMIN — HYDROMORPHONE HYDROCHLORIDE 1 MILLIGRAM(S): 2 INJECTION INTRAMUSCULAR; INTRAVENOUS; SUBCUTANEOUS at 00:41

## 2020-01-19 RX ADMIN — OXYCODONE HYDROCHLORIDE 15 MILLIGRAM(S): 5 TABLET ORAL at 10:33

## 2020-01-19 RX ADMIN — Medication 75 MILLIGRAM(S): at 05:12

## 2020-01-19 RX ADMIN — Medication 1 MILLIGRAM(S): at 11:29

## 2020-01-19 RX ADMIN — OXYCODONE HYDROCHLORIDE 10 MILLIGRAM(S): 5 TABLET ORAL at 07:33

## 2020-01-19 RX ADMIN — AMLODIPINE BESYLATE 5 MILLIGRAM(S): 2.5 TABLET ORAL at 05:12

## 2020-01-19 RX ADMIN — PANTOPRAZOLE SODIUM 40 MILLIGRAM(S): 20 TABLET, DELAYED RELEASE ORAL at 05:12

## 2020-01-19 RX ADMIN — HYDROMORPHONE HYDROCHLORIDE 1 MILLIGRAM(S): 2 INJECTION INTRAMUSCULAR; INTRAVENOUS; SUBCUTANEOUS at 04:17

## 2020-01-19 RX ADMIN — LACTULOSE 20 GRAM(S): 10 SOLUTION ORAL at 09:23

## 2020-01-19 RX ADMIN — OXYCODONE HYDROCHLORIDE 10 MILLIGRAM(S): 5 TABLET ORAL at 03:12

## 2020-01-19 RX ADMIN — HYDROMORPHONE HYDROCHLORIDE 1 MILLIGRAM(S): 2 INJECTION INTRAMUSCULAR; INTRAVENOUS; SUBCUTANEOUS at 00:35

## 2020-01-19 RX ADMIN — OXYCODONE HYDROCHLORIDE 15 MILLIGRAM(S): 5 TABLET ORAL at 11:33

## 2020-01-19 RX ADMIN — OXYCODONE HYDROCHLORIDE 5 MILLIGRAM(S): 5 TABLET ORAL at 08:38

## 2020-01-19 RX ADMIN — Medication 1 TABLET(S): at 11:29

## 2020-01-19 RX ADMIN — OXYCODONE HYDROCHLORIDE 5 MILLIGRAM(S): 5 TABLET ORAL at 09:38

## 2020-01-19 RX ADMIN — OXYCODONE HYDROCHLORIDE 10 MILLIGRAM(S): 5 TABLET ORAL at 06:59

## 2020-01-19 RX ADMIN — SODIUM CHLORIDE 3 MILLILITER(S): 9 INJECTION INTRAMUSCULAR; INTRAVENOUS; SUBCUTANEOUS at 05:14

## 2020-01-19 RX ADMIN — CYCLOBENZAPRINE HYDROCHLORIDE 10 MILLIGRAM(S): 10 TABLET, FILM COATED ORAL at 05:12

## 2020-01-19 RX ADMIN — HYDROMORPHONE HYDROCHLORIDE 1 MILLIGRAM(S): 2 INJECTION INTRAMUSCULAR; INTRAVENOUS; SUBCUTANEOUS at 05:17

## 2020-01-19 RX ADMIN — LOSARTAN POTASSIUM 100 MILLIGRAM(S): 100 TABLET, FILM COATED ORAL at 05:12

## 2020-01-19 NOTE — DIETITIAN INITIAL EVALUATION ADULT. - PERTINENT LABORATORY DATA
01-19 Na139 mmol/L Glu 100 mg/dL<H> K+ 3.9 mmol/L Cr  0.99 mg/dL BUN 15 mg/dL 01-03 [id number] 5.8 %<H>

## 2020-01-19 NOTE — DIETITIAN INITIAL EVALUATION ADULT. - PERTINENT MEDS FT
MEDICATIONS  (STANDING):  acetaminophen  IVPB .. 1000 milliGRAM(s) IV Intermittent once  amLODIPine   Tablet 5 milliGRAM(s) Oral daily  cyclobenzaprine 10 milliGRAM(s) Oral every 8 hours  folic acid 1 milliGRAM(s) Oral daily  losartan 100 milliGRAM(s) Oral daily  multivitamin 1 Tablet(s) Oral daily  pantoprazole    Tablet 40 milliGRAM(s) Oral before breakfast  polyethylene glycol 3350 17 Gram(s) Oral once  pregabalin 75 milliGRAM(s) Oral two times a day  senna 2 Tablet(s) Oral at bedtime  sodium chloride 0.9% lock flush 3 milliLiter(s) IV Push every 8 hours    MEDICATIONS  (PRN):  diphenhydrAMINE   Injectable 12.5 milliGRAM(s) IV Push every 4 hours PRN Itching  HYDROmorphone  Injectable 1 milliGRAM(s) IV Push every 4 hours PRN breakthrough pain  oxyCODONE    IR 10 milliGRAM(s) Oral every 4 hours PRN Moderate Pain (4 - 6)  oxyCODONE    IR 15 milliGRAM(s) Oral every 4 hours PRN Severe Pain (7 - 10)

## 2020-01-19 NOTE — PROGRESS NOTE ADULT - SUBJECTIVE AND OBJECTIVE BOX
64yMale s/p XLIF/PSF L2-5 POD#2. Pt seen and examined in NAD. Pain controlled. Pt denies any new complaints. Pt denies CP/SOB/N/V/D/numbness/tingling/bowel or bladder dysfunction. +flatus    PE:   Neuro: AAOX3  Spine: Dressing c/d/i   Abd: Soft, Moderately distended. NTTP. Left flank dressing C/D/i.    B/L UE: Skin intact. +ROM shoulder/elbow/wrist/fingers. +ok/thumbsup/fingercross signs.  strength: 5/5.  RP2+ NVI.   B/L LE: Skin intact. +ROM hip/knee/ankle/toes. Ankle Dorsi/plantarflexion: 5/5. Calf: soft, compressible and nontender. DP/PT 2+ NVI.                             11.2   9.48  )-----------( 236      ( 19 Jan 2020 06:55 )             36.1       01-19    139  |  102  |  15  ----------------------------<  100<H>  3.9   |  32<H>  |  0.99    Ca    8.6      19 Jan 2020 06:55          A/P: 64yMale s/p XLIF/PSF L2-5 POD#2  Pain control: Oxy 15mg trial  PT: WBAT - spinal precautions   DVT ppx: SCDs   Wound care, Isometric exercises, incentive spirometry   Medical consult appreciated  Discharge: planning home today pending pain control.   Pt seen in am with Dr. Conde  All the above discussed and understood by pt 64yMale s/p XLIF/PSF L2-5 POD#2. Pt seen and examined in NAD. Pain is not controlled. Was taking percocet 10/325mg q6hr preop. Pt denies any new complaints. Pt denies CP/SOB/N/V/D/numbness/tingling/bowel or bladder dysfunction. +flatus.    PE:   Neuro: AAOX3  Spine: Dressing saturated sanguinous. Incisions: nylon sutures, no active drainage. No collections.   Abd: Soft, Moderately distended. NTTP. Left flank dressing C/D/i.    B/L UE: Skin intact. +ROM shoulder/elbow/wrist/fingers. +ok/thumbsup/fingercross signs.  strength: 5/5.  RP2+ NVI.   B/L LE: Skin intact. +ROM hip/knee/ankle/toes. Ankle Dorsi/plantarflexion: 5/5. Calf: soft, compressible and nontender. DP/PT 2+ NVI.                             11.2   9.48  )-----------( 236      ( 19 Jan 2020 06:55 )             36.1       01-19    139  |  102  |  15  ----------------------------<  100<H>  3.9   |  32<H>  |  0.99    Ca    8.6      19 Jan 2020 06:55          A/P: 64yMale s/p XLIF/PSF L2-5 POD#2  Dressings changed - tagaderm/4X4 dressings applied.   Pain control: Oxy 15mg trial  PT: WBAT - spinal precautions   DVT ppx: SCDs   Wound care, Isometric exercises, incentive spirometry   Medical consult appreciated  Discharge: planning home today pending pain control.   Pt seen in am with Dr. Conde  All the above discussed and understood by pt

## 2020-01-19 NOTE — DIETITIAN INITIAL EVALUATION ADULT. - OTHER INFO
Pt lives with spouse. Spouse does cooking and shopping. Pt independent with ADL. Denies N/V/D reports some C s/p procedure, RN & MD aware. Denies any difficulty with chewing or swallowing. Pt reported good appetite with meals. Pt reported # >2 year prior to Gastric Sleeve procedure. Pt reported non-restrictive diet "I eat everything until I am full." Frequent snacking and some weight regain. Pt reported taking multivitamin some days a week.  Provided education to pt on energy-dense vs. nutrition dense foods, foods that help with wound healing and increasing physical activity as deemed medically feasible. Pt able to teach back to RD. Provided handout: General, Healthful Mediterranean Nutrition Therapy.

## 2020-01-23 DIAGNOSIS — M41.9 SCOLIOSIS, UNSPECIFIED: ICD-10-CM

## 2020-01-23 DIAGNOSIS — I10 ESSENTIAL (PRIMARY) HYPERTENSION: ICD-10-CM

## 2020-01-23 DIAGNOSIS — K21.9 GASTRO-ESOPHAGEAL REFLUX DISEASE WITHOUT ESOPHAGITIS: ICD-10-CM

## 2020-01-23 DIAGNOSIS — M54.16 RADICULOPATHY, LUMBAR REGION: ICD-10-CM

## 2021-02-13 DIAGNOSIS — Z01.818 ENCOUNTER FOR OTHER PREPROCEDURAL EXAMINATION: ICD-10-CM

## 2021-02-13 PROBLEM — K21.9 GASTRO-ESOPHAGEAL REFLUX DISEASE WITHOUT ESOPHAGITIS: Chronic | Status: ACTIVE | Noted: 2020-01-03

## 2021-02-14 ENCOUNTER — APPOINTMENT (OUTPATIENT)
Dept: DISASTER EMERGENCY | Facility: CLINIC | Age: 66
End: 2021-02-14

## 2021-02-15 LAB — SARS-COV-2 N GENE NPH QL NAA+PROBE: NOT DETECTED

## 2021-04-09 ENCOUNTER — APPOINTMENT (OUTPATIENT)
Dept: DISASTER EMERGENCY | Facility: CLINIC | Age: 66
End: 2021-04-09

## 2021-04-10 LAB — SARS-COV-2 N GENE NPH QL NAA+PROBE: NOT DETECTED

## 2021-06-03 NOTE — PHYSICAL THERAPY INITIAL EVALUATION ADULT - SITTING BALANCE: DYNAMIC
[facility] Nurse Triage Telephone Note      Provider: GUSTAVO Deshpande  Facility: [facility]    Facility Type: [facility]    Caller: Patient  Call Back Number:  [phone]    Allergies: Blood-group specific substance; Celebrex [celecoxib]; Lisinopril; Tetanus vaccines and toxoid; Piroxicam; and Sulfa (sulfonamide antibiotics)    Reason for call: Pt C/O knee pain & wonders what more she can take, specifically wonders about taking more Ibuprofen. She states she took one 7am & another @ 12noon. She is calling at 12:30.     When I called pt back & informed her No, she couldn't take any more Ibup & inquired about her taking her Oxy & Tylenol she stated yes she had. When I asked what times she seemed very confused & not clear about when. I asked if she had tried ice pack or warm pack & her Biofreeze, she stated yes.    Verbal Order/Direction given by Provider: No, she can't take any more Ibuprofen. She is on Oxycodone 2.5mg three times a day, and also Tylenol 650 three times a day. She should use her Biofreeze & ice or warm packs.  I will see her Thur.    Provider giving order: GUSTAVO Deshpande    Verbal order given to: Patient.      Mirella Sharma RN      
normal balance

## 2021-10-21 ENCOUNTER — OUTPATIENT (OUTPATIENT)
Dept: OUTPATIENT SERVICES | Facility: HOSPITAL | Age: 66
LOS: 1 days | Discharge: HOME | End: 2021-10-21
Payer: COMMERCIAL

## 2021-10-21 DIAGNOSIS — S32.009K UNSPECIFIED FRACTURE OF UNSPECIFIED LUMBAR VERTEBRA, SUBSEQUENT ENCOUNTER FOR FRACTURE WITH NONUNION: ICD-10-CM

## 2021-10-21 DIAGNOSIS — Z01.818 ENCOUNTER FOR OTHER PREPROCEDURAL EXAMINATION: ICD-10-CM

## 2021-10-21 DIAGNOSIS — Z98.890 OTHER SPECIFIED POSTPROCEDURAL STATES: Chronic | ICD-10-CM

## 2021-10-21 DIAGNOSIS — Z98.1 ARTHRODESIS STATUS: Chronic | ICD-10-CM

## 2021-10-21 DIAGNOSIS — M54.16 RADICULOPATHY, LUMBAR REGION: ICD-10-CM

## 2021-10-21 DIAGNOSIS — I10 ESSENTIAL (PRIMARY) HYPERTENSION: ICD-10-CM

## 2021-10-21 LAB
A1C WITH ESTIMATED AVERAGE GLUCOSE RESULT: 6 % — HIGH (ref 4–5.6)
ANION GAP SERPL CALC-SCNC: 3 MMOL/L — LOW (ref 5–17)
APTT BLD: 35.2 SEC — SIGNIFICANT CHANGE UP (ref 27.5–35.5)
BASOPHILS # BLD AUTO: 0.06 K/UL — SIGNIFICANT CHANGE UP (ref 0–0.2)
BASOPHILS NFR BLD AUTO: 1 % — SIGNIFICANT CHANGE UP (ref 0–2)
BUN SERPL-MCNC: 16 MG/DL — SIGNIFICANT CHANGE UP (ref 7–23)
CALCIUM SERPL-MCNC: 9.3 MG/DL — SIGNIFICANT CHANGE UP (ref 8.5–10.1)
CHLORIDE SERPL-SCNC: 105 MMOL/L — SIGNIFICANT CHANGE UP (ref 96–108)
CO2 SERPL-SCNC: 33 MMOL/L — HIGH (ref 22–31)
CREAT SERPL-MCNC: 1.15 MG/DL — SIGNIFICANT CHANGE UP (ref 0.5–1.3)
EOSINOPHIL # BLD AUTO: 0.51 K/UL — HIGH (ref 0–0.5)
EOSINOPHIL NFR BLD AUTO: 8.6 % — HIGH (ref 0–6)
ESTIMATED AVERAGE GLUCOSE: 126 MG/DL — HIGH (ref 68–114)
GLUCOSE SERPL-MCNC: 95 MG/DL — SIGNIFICANT CHANGE UP (ref 70–99)
HCT VFR BLD CALC: 41.7 % — SIGNIFICANT CHANGE UP (ref 39–50)
HGB BLD-MCNC: 13.2 G/DL — SIGNIFICANT CHANGE UP (ref 13–17)
IMM GRANULOCYTES NFR BLD AUTO: 0.2 % — SIGNIFICANT CHANGE UP (ref 0–1.5)
INR BLD: 0.94 RATIO — SIGNIFICANT CHANGE UP (ref 0.88–1.16)
LYMPHOCYTES # BLD AUTO: 1.25 K/UL — SIGNIFICANT CHANGE UP (ref 1–3.3)
LYMPHOCYTES # BLD AUTO: 21 % — SIGNIFICANT CHANGE UP (ref 13–44)
MCHC RBC-ENTMCNC: 26.6 PG — LOW (ref 27–34)
MCHC RBC-ENTMCNC: 31.7 GM/DL — LOW (ref 32–36)
MCV RBC AUTO: 83.9 FL — SIGNIFICANT CHANGE UP (ref 80–100)
MONOCYTES # BLD AUTO: 0.76 K/UL — SIGNIFICANT CHANGE UP (ref 0–0.9)
MONOCYTES NFR BLD AUTO: 12.8 % — SIGNIFICANT CHANGE UP (ref 2–14)
NEUTROPHILS # BLD AUTO: 3.35 K/UL — SIGNIFICANT CHANGE UP (ref 1.8–7.4)
NEUTROPHILS NFR BLD AUTO: 56.4 % — SIGNIFICANT CHANGE UP (ref 43–77)
NRBC # BLD: 0 /100 WBCS — SIGNIFICANT CHANGE UP (ref 0–0)
PLATELET # BLD AUTO: 327 K/UL — SIGNIFICANT CHANGE UP (ref 150–400)
POTASSIUM SERPL-MCNC: 4.5 MMOL/L — SIGNIFICANT CHANGE UP (ref 3.5–5.3)
POTASSIUM SERPL-SCNC: 4.5 MMOL/L — SIGNIFICANT CHANGE UP (ref 3.5–5.3)
PROTHROM AB SERPL-ACNC: 10.9 SEC — SIGNIFICANT CHANGE UP (ref 10.6–13.6)
RBC # BLD: 4.97 M/UL — SIGNIFICANT CHANGE UP (ref 4.2–5.8)
RBC # FLD: 13.7 % — SIGNIFICANT CHANGE UP (ref 10.3–14.5)
SODIUM SERPL-SCNC: 141 MMOL/L — SIGNIFICANT CHANGE UP (ref 135–145)
WBC # BLD: 5.94 K/UL — SIGNIFICANT CHANGE UP (ref 3.8–10.5)
WBC # FLD AUTO: 5.94 K/UL — SIGNIFICANT CHANGE UP (ref 3.8–10.5)

## 2021-10-21 PROCEDURE — 93010 ELECTROCARDIOGRAM REPORT: CPT

## 2021-10-21 RX ORDER — LANSOPRAZOLE 15 MG/1
1 CAPSULE, DELAYED RELEASE ORAL
Qty: 0 | Refills: 0 | DISCHARGE

## 2021-10-21 RX ORDER — TADALAFIL 10 MG/1
1 TABLET, FILM COATED ORAL
Qty: 0 | Refills: 0 | DISCHARGE

## 2021-10-21 NOTE — H&P PST ADULT - NSICDXPASTSURGICALHX_GEN_ALL_CORE_FT
PAST SURGICAL HISTORY:  S/P cervical disc replacement 2017    S/P gastric surgery - Gastric Sleeve ( 4 years ago )    S/P lumbar fusion 2020    S/P lumbar laminectomy 2 years ago

## 2021-10-21 NOTE — H&P PST ADULT - ENMT
No oral lesions; no gross abnormalities negative 4 days of fever initial with myalgia and abd pain, R sided chest pain that has since resolved, currently L sided. Will do EKG, troponin, code sepsis, X-ray, and reassess.

## 2021-10-21 NOTE — H&P PST ADULT - HISTORY OF PRESENT ILLNESS
66 yo male s/p lumbar fusion in 2020 , now with recurrent back pains - had evaluation- screws loosened - scheduled for anterior lumbar interbody fusion  denies recent travels in the past 30 days. No fever, SOB, cough, flu like symptoms or body rash- covid screen  covid vaccine completed

## 2021-10-21 NOTE — H&P PST ADULT - PROBLEM SELECTOR PLAN 3
Assessment and Plan: labs - cbc,pt/ptt,bmp,t&s,nose cx,ekg  M/C required  preop 3 day hibiclens instruction reviewed and given .instructed on if  nose cx positive use mupuricin 5 days and checklist given  take routine meds DOS with sips of water. avoid NSAID and OTC supplements. verbalized understanding  information on proper nutrition , increase protein and better food choices provided in packet  patient instructed on having covid19 swab 3 days prior to surgery  anesthesiologist to review pst labs, ekg, medical clearances and optimization for surgery

## 2021-10-22 VITALS
TEMPERATURE: 98 F | OXYGEN SATURATION: 96 % | SYSTOLIC BLOOD PRESSURE: 142 MMHG | HEART RATE: 75 BPM | WEIGHT: 215.83 LBS | HEIGHT: 71 IN | RESPIRATION RATE: 18 BRPM | DIASTOLIC BLOOD PRESSURE: 87 MMHG

## 2021-10-22 LAB
MRSA PCR RESULT.: SIGNIFICANT CHANGE UP
S AUREUS DNA NOSE QL NAA+PROBE: SIGNIFICANT CHANGE UP

## 2021-11-02 ENCOUNTER — APPOINTMENT (OUTPATIENT)
Dept: DISASTER EMERGENCY | Facility: CLINIC | Age: 66
End: 2021-11-02

## 2021-11-03 LAB — SARS-COV-2 N GENE NPH QL NAA+PROBE: NOT DETECTED

## 2021-11-29 ENCOUNTER — OUTPATIENT (OUTPATIENT)
Dept: OUTPATIENT SERVICES | Facility: HOSPITAL | Age: 66
LOS: 1 days | Discharge: HOME | End: 2021-11-29

## 2021-11-29 VITALS
WEIGHT: 217.16 LBS | HEIGHT: 72 IN | OXYGEN SATURATION: 97 % | RESPIRATION RATE: 18 BRPM | HEART RATE: 69 BPM | TEMPERATURE: 98 F | DIASTOLIC BLOOD PRESSURE: 89 MMHG | SYSTOLIC BLOOD PRESSURE: 186 MMHG

## 2021-11-29 VITALS — DIASTOLIC BLOOD PRESSURE: 93 MMHG | SYSTOLIC BLOOD PRESSURE: 160 MMHG

## 2021-11-29 DIAGNOSIS — I10 ESSENTIAL (PRIMARY) HYPERTENSION: ICD-10-CM

## 2021-11-29 DIAGNOSIS — E11.9 TYPE 2 DIABETES MELLITUS WITHOUT COMPLICATIONS: ICD-10-CM

## 2021-11-29 DIAGNOSIS — Z98.890 OTHER SPECIFIED POSTPROCEDURAL STATES: Chronic | ICD-10-CM

## 2021-11-29 DIAGNOSIS — K21.9 GASTRO-ESOPHAGEAL REFLUX DISEASE WITHOUT ESOPHAGITIS: ICD-10-CM

## 2021-11-29 DIAGNOSIS — Z98.1 ARTHRODESIS STATUS: Chronic | ICD-10-CM

## 2021-11-29 DIAGNOSIS — Z01.818 ENCOUNTER FOR OTHER PREPROCEDURAL EXAMINATION: ICD-10-CM

## 2021-11-29 DIAGNOSIS — S32.009K UNSPECIFIED FRACTURE OF UNSPECIFIED LUMBAR VERTEBRA, SUBSEQUENT ENCOUNTER FOR FRACTURE WITH NONUNION: ICD-10-CM

## 2021-11-29 DIAGNOSIS — S32.008K: ICD-10-CM

## 2021-11-29 LAB
A1C WITH ESTIMATED AVERAGE GLUCOSE RESULT: 5.8 % — HIGH (ref 4–5.6)
ANION GAP SERPL CALC-SCNC: 2 MMOL/L — LOW (ref 5–17)
APTT BLD: 33.9 SEC — SIGNIFICANT CHANGE UP (ref 27.5–35.5)
BLD GP AB SCN SERPL QL: SIGNIFICANT CHANGE UP
BUN SERPL-MCNC: 12 MG/DL — SIGNIFICANT CHANGE UP (ref 7–23)
CALCIUM SERPL-MCNC: 9 MG/DL — SIGNIFICANT CHANGE UP (ref 8.5–10.1)
CHLORIDE SERPL-SCNC: 106 MMOL/L — SIGNIFICANT CHANGE UP (ref 96–108)
CO2 SERPL-SCNC: 34 MMOL/L — HIGH (ref 22–31)
CREAT SERPL-MCNC: 0.96 MG/DL — SIGNIFICANT CHANGE UP (ref 0.5–1.3)
ESTIMATED AVERAGE GLUCOSE: 120 MG/DL — HIGH (ref 68–114)
GLUCOSE SERPL-MCNC: 95 MG/DL — SIGNIFICANT CHANGE UP (ref 70–99)
HCT VFR BLD CALC: 42.2 % — SIGNIFICANT CHANGE UP (ref 39–50)
HGB BLD-MCNC: 13.4 G/DL — SIGNIFICANT CHANGE UP (ref 13–17)
INR BLD: 0.87 RATIO — LOW (ref 0.88–1.16)
MCHC RBC-ENTMCNC: 26.5 PG — LOW (ref 27–34)
MCHC RBC-ENTMCNC: 31.8 GM/DL — LOW (ref 32–36)
MCV RBC AUTO: 83.6 FL — SIGNIFICANT CHANGE UP (ref 80–100)
MRSA PCR RESULT.: SIGNIFICANT CHANGE UP
NRBC # BLD: 0 /100 WBCS — SIGNIFICANT CHANGE UP (ref 0–0)
PLATELET # BLD AUTO: 306 K/UL — SIGNIFICANT CHANGE UP (ref 150–400)
POTASSIUM SERPL-MCNC: 4.8 MMOL/L — SIGNIFICANT CHANGE UP (ref 3.5–5.3)
POTASSIUM SERPL-SCNC: 4.8 MMOL/L — SIGNIFICANT CHANGE UP (ref 3.5–5.3)
PROTHROM AB SERPL-ACNC: 10.2 SEC — LOW (ref 10.6–13.6)
RBC # BLD: 5.05 M/UL — SIGNIFICANT CHANGE UP (ref 4.2–5.8)
RBC # FLD: 14 % — SIGNIFICANT CHANGE UP (ref 10.3–14.5)
S AUREUS DNA NOSE QL NAA+PROBE: SIGNIFICANT CHANGE UP
SODIUM SERPL-SCNC: 142 MMOL/L — SIGNIFICANT CHANGE UP (ref 135–145)
WBC # BLD: 7.24 K/UL — SIGNIFICANT CHANGE UP (ref 3.8–10.5)
WBC # FLD AUTO: 7.24 K/UL — SIGNIFICANT CHANGE UP (ref 3.8–10.5)

## 2021-11-29 NOTE — H&P PST ADULT - ASSESSMENT
66 yo male s/p lumbar fusion in 2020 , now with recurrent back pains - had evaluation- screws loosened - scheduled for anterior lumbar interbody fusion  CAPRINI SCORE    AGE RELATED RISK FACTORS                                                       MOBILITY RELATED FACTORS  [ ] Age 41-60 years                                            (1 Point)                  [ ] Bed rest                                                        (1 Point)  [x ] Age: 61-74 years                                           (2 Points)                [ ] Plaster cast                                                   (2 Points)  [ ] Age= 75 years                                              (3 Points)                 [ ] Bed bound for more than 72 hours                   (2 Points)    DISEASE RELATED RISK FACTORS                                               GENDER SPECIFIC FACTORS  [ ] Edema in the lower extremities                       (1 Point)                  [ ] Pregnancy                                                     (1 Point)  [ ] Varicose veins                                               (1 Point)                  [ ] Post-partum < 6 weeks                                   (1 Point)             x[ ] BMI > 25 Kg/m2                                            (1 Point)                  [ ] Hormonal therapy  or oral contraception            (1 Point)                 [ ] Sepsis (in the previous month)                        (1 Point)                  [ ] History of pregnancy complications  [ ] Pneumonia or serious lung disease                                               [ ] Unexplained or recurrent                       (1 Point)           (in the previous month)                               (1 Point)  [ ] Abnormal pulmonary function test                     (1 Point)                 SURGERY RELATED RISK FACTORS  [ ] Acute myocardial infarction                              (1 Point)                 [ ]  Section                                            (1 Point)  [ ] Congestive heart failure (in the previous month)  (1 Point)                 [ ] Minor surgery                                                 (1 Point)   [ ] Inflammatory bowel disease                             (1 Point)                 [ ] Arthroscopic surgery                                        (2 Points)  [ ] Central venous access                                    (2 Points)                [x ] General surgery lasting more than 45 minutes   (2 Points)       [ ] Stroke (in the previous month)                          (5 Points)               [ ] Elective arthroplasty                                        (5 Points)                                                                                                                                               HEMATOLOGY RELATED FACTORS                                                 TRAUMA RELATED RISK FACTORS  [ ] Prior episodes of VTE                                     (3 Points)                 [ ] Fracture of the hip, pelvis, or leg                       (5 Points)  [ ] Positive family history for VTE                         (3 Points)                 [ ] Acute spinal cord injury (in the previous month)  (5 Points)  [ ] Prothrombin 90962 A                                      (3 Points)                 [ ] Paralysis  (less than 1 month)                          (5 Points)  [ ] Factor V Leiden                                             (3 Points)                 [ ] Multiple Trauma within 1 month                         (5 Points)  [ ] Lupus anticoagulants                                     (3 Points)                                                           [ ] Anticardiolipin antibodies                                (3 Points)                                                       [ ] High homocysteine in the blood                      (3 Points)                                             [ ] Other congenital or acquired thrombophilia       (3 Points)                                                [ ] Heparin induced thrombocytopenia                  (3 Points)                                          Total Score [   5       ]

## 2021-11-29 NOTE — H&P PST ADULT - NSICDXPASTMEDICALHX_GEN_ALL_CORE_FT
PAST MEDICAL HISTORY:  Essential hypertension     GERD (gastroesophageal reflux disease)      PAST MEDICAL HISTORY:  DM (diabetes mellitus)     Essential hypertension     GERD (gastroesophageal reflux disease)

## 2021-11-29 NOTE — H&P PST ADULT - HISTORY OF PRESENT ILLNESS
......64 yo male s/p lumbar fusion in 2020 , now with recurrent back pains - had evaluation- screws loosened - scheduled for anterior lumbar interbody fusion  denies recent travels in the past 30 days. No fever, SOB, cough, flu like symptoms or body rash- covid screen  covid vaccine completed   64 yo male s/p lumbar fusion in 2020 , now with recurrent back pains - had evaluation- screws loosened - scheduled for anterior lumbar interbody fusion  This surgery was previously scheduled but was cancelled   This patient reports being infected with COVID 3-2021 was NOT hospitalized and has fully recovered currently denies recent travels in the past 30 days. No fever, SOB, cough, flu like symptoms or body rash- covid screen  covid vaccine including booster completed

## 2021-11-29 NOTE — H&P PST ADULT - PROBLEM SELECTOR PLAN 1
Anterior lumbar interbody fusion  labs - cbc,pt/ptt,bmp,t&s,nose cx,ekg  M/C required  preop 3 day hibiclens instruction reviewed and given .instructed on if  nose cx positive use mupuricin 5 days and checklist given  take routine meds DOS with sips of water. avoid NSAID and OTC supplements. verbalized understanding  information on proper nutrition , increase protein and better food choices provided in packet

## 2021-12-14 ENCOUNTER — TRANSCRIPTION ENCOUNTER (OUTPATIENT)
Age: 66
End: 2021-12-14

## 2021-12-15 ENCOUNTER — TRANSCRIPTION ENCOUNTER (OUTPATIENT)
Age: 66
End: 2021-12-15

## 2021-12-15 ENCOUNTER — INPATIENT (INPATIENT)
Facility: HOSPITAL | Age: 66
LOS: 3 days | Discharge: HOME | End: 2021-12-19
Attending: ORTHOPAEDIC SURGERY | Admitting: ORTHOPAEDIC SURGERY
Payer: COMMERCIAL

## 2021-12-15 ENCOUNTER — RESULT REVIEW (OUTPATIENT)
Age: 66
End: 2021-12-15

## 2021-12-15 VITALS
HEART RATE: 69 BPM | DIASTOLIC BLOOD PRESSURE: 90 MMHG | SYSTOLIC BLOOD PRESSURE: 166 MMHG | TEMPERATURE: 98 F | WEIGHT: 214.95 LBS | RESPIRATION RATE: 18 BRPM | HEIGHT: 71 IN | OXYGEN SATURATION: 97 %

## 2021-12-15 DIAGNOSIS — Z98.1 ARTHRODESIS STATUS: Chronic | ICD-10-CM

## 2021-12-15 DIAGNOSIS — Z98.890 OTHER SPECIFIED POSTPROCEDURAL STATES: Chronic | ICD-10-CM

## 2021-12-15 LAB
ANION GAP SERPL CALC-SCNC: 5 MMOL/L — SIGNIFICANT CHANGE UP (ref 5–17)
BASOPHILS # BLD AUTO: 0.02 K/UL — SIGNIFICANT CHANGE UP (ref 0–0.2)
BASOPHILS NFR BLD AUTO: 0.2 % — SIGNIFICANT CHANGE UP (ref 0–2)
BUN SERPL-MCNC: 15 MG/DL — SIGNIFICANT CHANGE UP (ref 7–23)
CALCIUM SERPL-MCNC: 8.7 MG/DL — SIGNIFICANT CHANGE UP (ref 8.5–10.1)
CHLORIDE SERPL-SCNC: 107 MMOL/L — SIGNIFICANT CHANGE UP (ref 96–108)
CO2 SERPL-SCNC: 31 MMOL/L — SIGNIFICANT CHANGE UP (ref 22–31)
CREAT SERPL-MCNC: 1.09 MG/DL — SIGNIFICANT CHANGE UP (ref 0.5–1.3)
EOSINOPHIL # BLD AUTO: 0.04 K/UL — SIGNIFICANT CHANGE UP (ref 0–0.5)
EOSINOPHIL NFR BLD AUTO: 0.3 % — SIGNIFICANT CHANGE UP (ref 0–6)
GLUCOSE SERPL-MCNC: 141 MG/DL — HIGH (ref 70–99)
HCT VFR BLD CALC: 38.4 % — LOW (ref 39–50)
HGB BLD-MCNC: 12.3 G/DL — LOW (ref 13–17)
IMM GRANULOCYTES NFR BLD AUTO: 0.4 % — SIGNIFICANT CHANGE UP (ref 0–1.5)
LYMPHOCYTES # BLD AUTO: 1.24 K/UL — SIGNIFICANT CHANGE UP (ref 1–3.3)
LYMPHOCYTES # BLD AUTO: 9.9 % — LOW (ref 13–44)
MCHC RBC-ENTMCNC: 26.9 PG — LOW (ref 27–34)
MCHC RBC-ENTMCNC: 32 GM/DL — SIGNIFICANT CHANGE UP (ref 32–36)
MCV RBC AUTO: 83.8 FL — SIGNIFICANT CHANGE UP (ref 80–100)
MONOCYTES # BLD AUTO: 0.28 K/UL — SIGNIFICANT CHANGE UP (ref 0–0.9)
MONOCYTES NFR BLD AUTO: 2.2 % — SIGNIFICANT CHANGE UP (ref 2–14)
NEUTROPHILS # BLD AUTO: 10.91 K/UL — HIGH (ref 1.8–7.4)
NEUTROPHILS NFR BLD AUTO: 87 % — HIGH (ref 43–77)
NRBC # BLD: 0 /100 WBCS — SIGNIFICANT CHANGE UP (ref 0–0)
PLATELET # BLD AUTO: 225 K/UL — SIGNIFICANT CHANGE UP (ref 150–400)
POTASSIUM SERPL-MCNC: 4.1 MMOL/L — SIGNIFICANT CHANGE UP (ref 3.5–5.3)
POTASSIUM SERPL-SCNC: 4.1 MMOL/L — SIGNIFICANT CHANGE UP (ref 3.5–5.3)
RBC # BLD: 4.58 M/UL — SIGNIFICANT CHANGE UP (ref 4.2–5.8)
RBC # FLD: 14.1 % — SIGNIFICANT CHANGE UP (ref 10.3–14.5)
SODIUM SERPL-SCNC: 143 MMOL/L — SIGNIFICANT CHANGE UP (ref 135–145)
WBC # BLD: 12.54 K/UL — HIGH (ref 3.8–10.5)
WBC # FLD AUTO: 12.54 K/UL — HIGH (ref 3.8–10.5)

## 2021-12-15 PROCEDURE — 88300 SURGICAL PATH GROSS: CPT | Mod: 26

## 2021-12-15 RX ORDER — CYCLOBENZAPRINE HYDROCHLORIDE 10 MG/1
10 TABLET, FILM COATED ORAL EVERY 8 HOURS
Refills: 0 | Status: DISCONTINUED | OUTPATIENT
Start: 2021-12-15 | End: 2021-12-19

## 2021-12-15 RX ORDER — LOSARTAN POTASSIUM 100 MG/1
100 TABLET, FILM COATED ORAL DAILY
Refills: 0 | Status: DISCONTINUED | OUTPATIENT
Start: 2021-12-15 | End: 2021-12-19

## 2021-12-15 RX ORDER — SODIUM CHLORIDE 9 MG/ML
3 INJECTION INTRAMUSCULAR; INTRAVENOUS; SUBCUTANEOUS EVERY 8 HOURS
Refills: 0 | Status: DISCONTINUED | OUTPATIENT
Start: 2021-12-15 | End: 2021-12-15

## 2021-12-15 RX ORDER — SODIUM CHLORIDE 9 MG/ML
1000 INJECTION, SOLUTION INTRAVENOUS
Refills: 0 | Status: DISCONTINUED | OUTPATIENT
Start: 2021-12-15 | End: 2021-12-19

## 2021-12-15 RX ORDER — LOSARTAN POTASSIUM 100 MG/1
1 TABLET, FILM COATED ORAL
Qty: 0 | Refills: 0 | DISCHARGE

## 2021-12-15 RX ORDER — CEFAZOLIN SODIUM 1 G
2000 VIAL (EA) INJECTION EVERY 8 HOURS
Refills: 0 | Status: COMPLETED | OUTPATIENT
Start: 2021-12-15 | End: 2021-12-16

## 2021-12-15 RX ORDER — HYDROMORPHONE HYDROCHLORIDE 2 MG/ML
0.5 INJECTION INTRAMUSCULAR; INTRAVENOUS; SUBCUTANEOUS
Refills: 0 | Status: DISCONTINUED | OUTPATIENT
Start: 2021-12-15 | End: 2021-12-19

## 2021-12-15 RX ORDER — METOCLOPRAMIDE HCL 10 MG
10 TABLET ORAL ONCE
Refills: 0 | Status: DISCONTINUED | OUTPATIENT
Start: 2021-12-15 | End: 2021-12-19

## 2021-12-15 RX ORDER — SODIUM CHLORIDE 9 MG/ML
1000 INJECTION, SOLUTION INTRAVENOUS
Refills: 0 | Status: DISCONTINUED | OUTPATIENT
Start: 2021-12-15 | End: 2021-12-15

## 2021-12-15 RX ORDER — OXYCODONE HYDROCHLORIDE 5 MG/1
15 TABLET ORAL EVERY 4 HOURS
Refills: 0 | Status: DISCONTINUED | OUTPATIENT
Start: 2021-12-15 | End: 2021-12-16

## 2021-12-15 RX ORDER — ACETAMINOPHEN 500 MG
975 TABLET ORAL EVERY 8 HOURS
Refills: 0 | Status: DISCONTINUED | OUTPATIENT
Start: 2021-12-15 | End: 2021-12-19

## 2021-12-15 RX ORDER — POLYETHYLENE GLYCOL 3350 17 G/17G
17 POWDER, FOR SOLUTION ORAL
Refills: 0 | Status: DISCONTINUED | OUTPATIENT
Start: 2021-12-15 | End: 2021-12-19

## 2021-12-15 RX ORDER — OXYCODONE HYDROCHLORIDE 5 MG/1
10 TABLET ORAL EVERY 4 HOURS
Refills: 0 | Status: DISCONTINUED | OUTPATIENT
Start: 2021-12-15 | End: 2021-12-16

## 2021-12-15 RX ORDER — SENNA PLUS 8.6 MG/1
2 TABLET ORAL AT BEDTIME
Refills: 0 | Status: DISCONTINUED | OUTPATIENT
Start: 2021-12-15 | End: 2021-12-19

## 2021-12-15 RX ORDER — DIPHENHYDRAMINE HCL 50 MG
12.5 CAPSULE ORAL EVERY 4 HOURS
Refills: 0 | Status: DISCONTINUED | OUTPATIENT
Start: 2021-12-15 | End: 2021-12-19

## 2021-12-15 RX ORDER — HYDROMORPHONE HYDROCHLORIDE 2 MG/ML
1 INJECTION INTRAMUSCULAR; INTRAVENOUS; SUBCUTANEOUS
Refills: 0 | Status: DISCONTINUED | OUTPATIENT
Start: 2021-12-15 | End: 2021-12-15

## 2021-12-15 RX ORDER — AMLODIPINE BESYLATE 2.5 MG/1
5 TABLET ORAL DAILY
Refills: 0 | Status: DISCONTINUED | OUTPATIENT
Start: 2021-12-15 | End: 2021-12-19

## 2021-12-15 RX ORDER — ACETAMINOPHEN 500 MG
1000 TABLET ORAL ONCE
Refills: 0 | Status: COMPLETED | OUTPATIENT
Start: 2021-12-15 | End: 2021-12-15

## 2021-12-15 RX ADMIN — HYDROMORPHONE HYDROCHLORIDE 0.5 MILLIGRAM(S): 2 INJECTION INTRAMUSCULAR; INTRAVENOUS; SUBCUTANEOUS at 19:36

## 2021-12-15 RX ADMIN — Medication 400 MILLIGRAM(S): at 15:39

## 2021-12-15 RX ADMIN — SODIUM CHLORIDE 75 MILLILITER(S): 9 INJECTION, SOLUTION INTRAVENOUS at 13:44

## 2021-12-15 RX ADMIN — HYDROMORPHONE HYDROCHLORIDE 1 MILLIGRAM(S): 2 INJECTION INTRAMUSCULAR; INTRAVENOUS; SUBCUTANEOUS at 13:42

## 2021-12-15 RX ADMIN — SENNA PLUS 2 TABLET(S): 8.6 TABLET ORAL at 21:15

## 2021-12-15 RX ADMIN — SODIUM CHLORIDE 3 MILLILITER(S): 9 INJECTION INTRAMUSCULAR; INTRAVENOUS; SUBCUTANEOUS at 06:37

## 2021-12-15 RX ADMIN — POLYETHYLENE GLYCOL 3350 17 GRAM(S): 17 POWDER, FOR SOLUTION ORAL at 17:38

## 2021-12-15 RX ADMIN — Medication 975 MILLIGRAM(S): at 22:15

## 2021-12-15 RX ADMIN — Medication 975 MILLIGRAM(S): at 21:15

## 2021-12-15 RX ADMIN — Medication 100 MILLIGRAM(S): at 21:15

## 2021-12-15 RX ADMIN — OXYCODONE HYDROCHLORIDE 10 MILLIGRAM(S): 5 TABLET ORAL at 17:38

## 2021-12-15 RX ADMIN — HYDROMORPHONE HYDROCHLORIDE 1 MILLIGRAM(S): 2 INJECTION INTRAMUSCULAR; INTRAVENOUS; SUBCUTANEOUS at 13:08

## 2021-12-15 RX ADMIN — Medication 150 MILLIGRAM(S): at 17:38

## 2021-12-15 RX ADMIN — HYDROMORPHONE HYDROCHLORIDE 0.5 MILLIGRAM(S): 2 INJECTION INTRAMUSCULAR; INTRAVENOUS; SUBCUTANEOUS at 19:21

## 2021-12-15 RX ADMIN — HYDROMORPHONE HYDROCHLORIDE 1 MILLIGRAM(S): 2 INJECTION INTRAMUSCULAR; INTRAVENOUS; SUBCUTANEOUS at 13:23

## 2021-12-15 RX ADMIN — OXYCODONE HYDROCHLORIDE 10 MILLIGRAM(S): 5 TABLET ORAL at 18:38

## 2021-12-15 NOTE — PHYSICAL THERAPY INITIAL EVALUATION ADULT - ASR EQUIP NEEDS DISCH PT EVAL
Pt states he owns a rolling walker. Pt will check if he still has a 3:1 commode (will require one if he does have it)

## 2021-12-15 NOTE — DISCHARGE NOTE PROVIDER - HOSPITAL COURSE
66yMale with history of Lumbar radiculopathy presenting for ALIF L4-5/PSF by Dr. Pulliam on 12/15/21. Risk and benefits of surgery were explained to the patient. The patient understood and agreed to proceed with surgery. Patient underwent the procedure with no intraoperative complications. Pt was brought in stable condition to the PACU. Once stable in PACU, pt was brought to the floor. During hospital stay pt was followed by Medicine, physical therapy, Home Care during this admission. Pt had an uneventful hospital course. Pt is stable for discharge to home

## 2021-12-15 NOTE — PHYSICAL THERAPY INITIAL EVALUATION ADULT - ADDITIONAL COMMENTS
As per patient, he lives in a private home with his wife, son (both able to assist) and his mother, who has a HHA 12 hours/7 days. Pt states he has one threshold step to enter home at side entrance wide enough to fit rolling walker. Pt states he was independent with ADL's and functional mobility prior to admission. Pt states he has a rolling walker from previous surgery, however unsure if he still has 3:1 commode. Pt states his mothers bathroom has a raised toilet seat with grab bars to use if needed.

## 2021-12-15 NOTE — DISCHARGE NOTE PROVIDER - CARE PROVIDER_API CALL
Juan Pulliam)  Orthopaedic Surgery  [address]  [place] [address]  Phone: [phone]  Fax: [phone]  Follow Up Time:

## 2021-12-15 NOTE — DISCHARGE NOTE PROVIDER - NSDCMRMEDTOKEN_GEN_ALL_CORE_FT
amLODIPine 5 mg oral tablet: 1 tab(s) orally once a day  losartan 100 mg oral tablet: 1 tab(s) orally once a day  oxyCODONE 10 mg oral tablet: 1 tab(s) orally every 6 hours, As Needed  progablin: 1 tab(s) orally once a day   amLODIPine 5 mg oral tablet: 1 tab(s) orally once a day  cyclobenzaprine 10 mg oral tablet: 1 tab(s) orally every 8 hours MDD:3  losartan 100 mg oral tablet: 1 tab(s) orally once a day  oxyCODONE 5 mg oral tablet: 1 tab(s) orally every 4 hours, As Needed in addition to home dose -pain 6-10 MDD:6  polyethylene glycol 3350 oral powder for reconstitution: 17 gram(s) orally 2 times a day  senna oral tablet: 2 tab(s) orally once a day (at bedtime)

## 2021-12-15 NOTE — PROGRESS NOTE ADULT - SUBJECTIVE AND OBJECTIVE BOX
66yMale s/p ALIF L4-5/PSF POD#0  Pt seen and examined in NAD.   Pain controlled.  Pt denies any new complaints.   Pt denies CP/SOB/N/V/D/numbness/tingling/bowel or bladder dysfunction.   +Kwon    Vital Signs Last 24 Hrs  T(C): 36.4 (15 Dec 2021 17:36), Max: 36.7 (15 Dec 2021 06:32)  T(F): 97.5 (15 Dec 2021 17:36), Max: 98.1 (15 Dec 2021 06:32)  HR: 64 (15 Dec 2021 17:36) (62 - 84)  BP: 132/82 (15 Dec 2021 17:36) (132/82 - 166/105)  BP(mean): --  RR: 18 (15 Dec 2021 17:36) (12 - 18)  SpO2: 94% (15 Dec 2021 17:36) (94% - 100%)    PE:   General: A&Ox3, NAD  Abd: Dressing CDI  Spine: Dressing c/d/i   B/L UE: Skin intact. +ROM shoulder/elbow/wrist/fingers. +ok/thumbsup/fingercross signs.  strength: 5/5.  RP2+ NVI.   B/L LE: Skin intact. +ROM hip/knee/ankle/toes. Ankle Dorsi/plantarflexion: 5/5. Calf: soft, compressible and nontender. DP/PT 2+ NVI.                             12.3   12.54 )-----------( 225      ( 15 Dec 2021 13:38 )             38.4       12-15    143  |  107  |  15  ----------------------------<  141<H>  4.1   |  31  |  1.09    Ca    8.7      15 Dec 2021 13:38          A/P: 66yMale s/p ALIF/PSF POD#0  Dc kwon when OOB and ambulating  Full liquid diet until passing gas  Wound care  Pain controlled  PT: WBAT: Spinal precautions  Isometric exercises  DVT ppx: SCDs  Incentive spirometry counseled   Discharge: planning   All the above discussed and understood by pt

## 2021-12-15 NOTE — DISCHARGE NOTE PROVIDER - NSDCFUADDAPPT_GEN_ALL_CORE_FT
Follow up with your surgeon in two weeks. Call for appointment.  If you need more pain medication, call your surgeon's office. For medication refills or authorizations, please call [phone]  We recommend that you call and schedule a follow up appointment within 2-4 weeks with your primary care physician for repeat blood work (CBC and BMP) for post hospital discharge follow-up care.  Call your surgeon if you have increased redness/pain/drainage or fever. Return to ER for shortness of breath/calf tenderness.

## 2021-12-15 NOTE — PHYSICAL THERAPY INITIAL EVALUATION ADULT - BED MOBILITY LIMITATIONS, REHAB EVAL
log rolling/decreased ability to use arms for pushing/pulling/decreased ability to use legs for bridging/pushing/impaired ability to control trunk for mobility

## 2021-12-15 NOTE — DISCHARGE NOTE PROVIDER - NSDCCPTREATMENT_GEN_ALL_CORE_FT
PRINCIPAL PROCEDURE  Procedure: ALIF, 1 level  Findings and Treatment: revision L4-5; PSF L2-5

## 2021-12-15 NOTE — PHYSICAL THERAPY INITIAL EVALUATION ADULT - GENERAL OBSERVATIONS, REHAB EVAL
Pt found semi supine in bed in NAD, +hep lock, +SCDs, +kwon cath, agreeable to PT Analilia and RN Ayaz aware.

## 2021-12-15 NOTE — PATIENT PROFILE ADULT - FALL HARM RISK - HARM RISK INTERVENTIONS

## 2021-12-16 ENCOUNTER — TRANSCRIPTION ENCOUNTER (OUTPATIENT)
Age: 66
End: 2021-12-16

## 2021-12-16 LAB
ANION GAP SERPL CALC-SCNC: 5 MMOL/L — SIGNIFICANT CHANGE UP (ref 5–17)
BASOPHILS # BLD AUTO: 0.01 K/UL — SIGNIFICANT CHANGE UP (ref 0–0.2)
BASOPHILS NFR BLD AUTO: 0.1 % — SIGNIFICANT CHANGE UP (ref 0–2)
BUN SERPL-MCNC: 15 MG/DL — SIGNIFICANT CHANGE UP (ref 7–23)
CALCIUM SERPL-MCNC: 8.4 MG/DL — LOW (ref 8.5–10.1)
CHLORIDE SERPL-SCNC: 106 MMOL/L — SIGNIFICANT CHANGE UP (ref 96–108)
CO2 SERPL-SCNC: 29 MMOL/L — SIGNIFICANT CHANGE UP (ref 22–31)
CREAT SERPL-MCNC: 0.99 MG/DL — SIGNIFICANT CHANGE UP (ref 0.5–1.3)
EOSINOPHIL # BLD AUTO: 0 K/UL — SIGNIFICANT CHANGE UP (ref 0–0.5)
EOSINOPHIL NFR BLD AUTO: 0 % — SIGNIFICANT CHANGE UP (ref 0–6)
GLUCOSE SERPL-MCNC: 128 MG/DL — HIGH (ref 70–99)
HCT VFR BLD CALC: 37.1 % — LOW (ref 39–50)
HGB BLD-MCNC: 11.8 G/DL — LOW (ref 13–17)
IMM GRANULOCYTES NFR BLD AUTO: 0.6 % — SIGNIFICANT CHANGE UP (ref 0–1.5)
LYMPHOCYTES # BLD AUTO: 1.18 K/UL — SIGNIFICANT CHANGE UP (ref 1–3.3)
LYMPHOCYTES # BLD AUTO: 7.6 % — LOW (ref 13–44)
MCHC RBC-ENTMCNC: 26.5 PG — LOW (ref 27–34)
MCHC RBC-ENTMCNC: 31.8 GM/DL — LOW (ref 32–36)
MCV RBC AUTO: 83.4 FL — SIGNIFICANT CHANGE UP (ref 80–100)
MONOCYTES # BLD AUTO: 1.35 K/UL — HIGH (ref 0–0.9)
MONOCYTES NFR BLD AUTO: 8.7 % — SIGNIFICANT CHANGE UP (ref 2–14)
NEUTROPHILS # BLD AUTO: 12.97 K/UL — HIGH (ref 1.8–7.4)
NEUTROPHILS NFR BLD AUTO: 83 % — HIGH (ref 43–77)
NRBC # BLD: 0 /100 WBCS — SIGNIFICANT CHANGE UP (ref 0–0)
PLATELET # BLD AUTO: 296 K/UL — SIGNIFICANT CHANGE UP (ref 150–400)
POTASSIUM SERPL-MCNC: 4.5 MMOL/L — SIGNIFICANT CHANGE UP (ref 3.5–5.3)
POTASSIUM SERPL-SCNC: 4.5 MMOL/L — SIGNIFICANT CHANGE UP (ref 3.5–5.3)
RBC # BLD: 4.45 M/UL — SIGNIFICANT CHANGE UP (ref 4.2–5.8)
RBC # FLD: 14.2 % — SIGNIFICANT CHANGE UP (ref 10.3–14.5)
SODIUM SERPL-SCNC: 140 MMOL/L — SIGNIFICANT CHANGE UP (ref 135–145)
WBC # BLD: 15.6 K/UL — HIGH (ref 3.8–10.5)
WBC # FLD AUTO: 15.6 K/UL — HIGH (ref 3.8–10.5)

## 2021-12-16 RX ORDER — METOCLOPRAMIDE HCL 10 MG
10 TABLET ORAL EVERY 6 HOURS
Refills: 0 | Status: COMPLETED | OUTPATIENT
Start: 2021-12-16 | End: 2021-12-17

## 2021-12-16 RX ORDER — OXYCODONE HYDROCHLORIDE 5 MG/1
25 TABLET ORAL EVERY 4 HOURS
Refills: 0 | Status: DISCONTINUED | OUTPATIENT
Start: 2021-12-16 | End: 2021-12-19

## 2021-12-16 RX ORDER — OXYCODONE HYDROCHLORIDE 5 MG/1
20 TABLET ORAL EVERY 4 HOURS
Refills: 0 | Status: DISCONTINUED | OUTPATIENT
Start: 2021-12-16 | End: 2021-12-19

## 2021-12-16 RX ADMIN — OXYCODONE HYDROCHLORIDE 25 MILLIGRAM(S): 5 TABLET ORAL at 21:50

## 2021-12-16 RX ADMIN — HYDROMORPHONE HYDROCHLORIDE 0.5 MILLIGRAM(S): 2 INJECTION INTRAMUSCULAR; INTRAVENOUS; SUBCUTANEOUS at 12:19

## 2021-12-16 RX ADMIN — Medication 975 MILLIGRAM(S): at 15:00

## 2021-12-16 RX ADMIN — Medication 975 MILLIGRAM(S): at 06:41

## 2021-12-16 RX ADMIN — HYDROMORPHONE HYDROCHLORIDE 0.5 MILLIGRAM(S): 2 INJECTION INTRAMUSCULAR; INTRAVENOUS; SUBCUTANEOUS at 23:45

## 2021-12-16 RX ADMIN — Medication 975 MILLIGRAM(S): at 05:41

## 2021-12-16 RX ADMIN — OXYCODONE HYDROCHLORIDE 15 MILLIGRAM(S): 5 TABLET ORAL at 09:30

## 2021-12-16 RX ADMIN — OXYCODONE HYDROCHLORIDE 15 MILLIGRAM(S): 5 TABLET ORAL at 00:49

## 2021-12-16 RX ADMIN — LOSARTAN POTASSIUM 100 MILLIGRAM(S): 100 TABLET, FILM COATED ORAL at 05:42

## 2021-12-16 RX ADMIN — SODIUM CHLORIDE 75 MILLILITER(S): 9 INJECTION, SOLUTION INTRAVENOUS at 05:43

## 2021-12-16 RX ADMIN — OXYCODONE HYDROCHLORIDE 15 MILLIGRAM(S): 5 TABLET ORAL at 01:49

## 2021-12-16 RX ADMIN — Medication 10 MILLIGRAM(S): at 15:00

## 2021-12-16 RX ADMIN — Medication 150 MILLIGRAM(S): at 18:34

## 2021-12-16 RX ADMIN — OXYCODONE HYDROCHLORIDE 10 MILLIGRAM(S): 5 TABLET ORAL at 05:50

## 2021-12-16 RX ADMIN — Medication 150 MILLIGRAM(S): at 05:43

## 2021-12-16 RX ADMIN — POLYETHYLENE GLYCOL 3350 17 GRAM(S): 17 POWDER, FOR SOLUTION ORAL at 05:42

## 2021-12-16 RX ADMIN — OXYCODONE HYDROCHLORIDE 25 MILLIGRAM(S): 5 TABLET ORAL at 22:20

## 2021-12-16 RX ADMIN — Medication 5 MILLIGRAM(S): at 15:00

## 2021-12-16 RX ADMIN — AMLODIPINE BESYLATE 5 MILLIGRAM(S): 2.5 TABLET ORAL at 05:41

## 2021-12-16 RX ADMIN — OXYCODONE HYDROCHLORIDE 20 MILLIGRAM(S): 5 TABLET ORAL at 18:34

## 2021-12-16 RX ADMIN — OXYCODONE HYDROCHLORIDE 25 MILLIGRAM(S): 5 TABLET ORAL at 15:00

## 2021-12-16 RX ADMIN — OXYCODONE HYDROCHLORIDE 10 MILLIGRAM(S): 5 TABLET ORAL at 06:43

## 2021-12-16 RX ADMIN — HYDROMORPHONE HYDROCHLORIDE 0.5 MILLIGRAM(S): 2 INJECTION INTRAMUSCULAR; INTRAVENOUS; SUBCUTANEOUS at 03:38

## 2021-12-16 RX ADMIN — SENNA PLUS 2 TABLET(S): 8.6 TABLET ORAL at 21:53

## 2021-12-16 RX ADMIN — POLYETHYLENE GLYCOL 3350 17 GRAM(S): 17 POWDER, FOR SOLUTION ORAL at 18:34

## 2021-12-16 RX ADMIN — Medication 100 MILLIGRAM(S): at 05:41

## 2021-12-16 RX ADMIN — Medication 975 MILLIGRAM(S): at 23:44

## 2021-12-16 NOTE — DISCHARGE NOTE NURSING/CASE MANAGEMENT/SOCIAL WORK - NSDCFUADDAPPT_GEN_ALL_CORE_FT
Follow up with your surgeon in two weeks. Call for appointment.  If you need more pain medication, call your surgeon's office. For medication refills or authorizations, please call [phone] xt [phone]  We recommend that you call and schedule a follow up appointment within 2-4 weeks with your primary care physician for repeat blood work (CBC and BMP) for post hospital discharge follow-up care.  Call your surgeon if you have increased redness/pain/drainage or fever. Return to ER for shortness of breath/calf tenderness.

## 2021-12-16 NOTE — DISCHARGE NOTE NURSING/CASE MANAGEMENT/SOCIAL WORK - NSDCPEFALRISK_GEN_ALL_CORE
For information on Fall & Injury Prevention, visit: [email or web address] OR  [email or web address] OR  https://www.cdc.gov/steadi/patient.html

## 2021-12-16 NOTE — DISCHARGE NOTE NURSING/CASE MANAGEMENT/SOCIAL WORK - PATIENT PORTAL LINK FT
You can access the FollowMyHealth Patient Portal offered by [facility] by registering at the following website: [email or web address]. By joining [employer]’s FollowMyHealth portal, you will also be able to view your health information using other applications (apps) compatible with our system.

## 2021-12-16 NOTE — PROGRESS NOTE ADULT - SUBJECTIVE AND OBJECTIVE BOX
66yMale s/p     . Pt seen and examined in NAD. Pain controlled. Pt denies any new complaints. Pt denies CP/SOB/N/V/D/numbness/tingling/bowel or bladder dysfunction.     PE:   Neuro: AAOX3  Spine: Dressing c/d/i +HV +TIM   B/L UE: Skin intact. +ROM shoulder/elbow/wrist/fingers. +ok/thumbsup/fingercross signs.  strength: 5/5.  RP2+ NVI.   B/L LE: Skin intact. +ROM hip/knee/ankle/toes. Ankle Dorsi/plantarflexion: 5/5. Calf: soft, compressible and nontender. DP/PT 2+ NVI.                             11.8   15.60 )-----------( 296      ( 16 Dec 2021 08:05 )             37.1       12-16    140  |  106  |  15  ----------------------------<  128<H>  4.5   |  29  |  0.99    Ca    8.4<L>      16 Dec 2021 08:05          A/P: 66yMale s/p   Dressing changed -   Pain controlled  PT: WBAT - spinal precautions   DVT ppx: SCDs   Wound care, Isometric exercises, incentive spirometry   Medical consult appreciated  Discharge: planning   All the above discussed and understood by pt    66yMale s/p ALIF/PSF L4-5 POD#1. Pt seen and examined in NAD. Pain is not controlled. Was taking oxy 20mg tabs preop sometimes 1.5 tabs as needed not more than 3X/day.  Pt denies any new complaints. Pt denies CP/SOB/N/V/D/numbness/tingling/bowel or bladder dysfunction. Scant flatus. Preop hip pain resolved. Now with post operative incisional pain.     PE:   Neuro: AAOX3  Abd: Dressing LLQ C/D/I. Abdomen moderately distended. Mild TTP. No guarding.   Spine: Dressing c/d/i   B/L UE: Skin intact. +ROM shoulder/elbow/wrist/fingers. +ok/thumbsup/fingercross signs.  strength: 5/5.  RP2+ NVI.   B/L LE: Skin intact. +ROM hip/knee/ankle/toes. Ankle Dorsi/plantarflexion: 5/5. Calf: soft, compressible and nontender. DP/PT 2+ NVI.                             11.8   15.60 )-----------( 296      ( 16 Dec 2021 08:05 )             37.1       12-16    140  |  106  |  15  ----------------------------<  128<H>  4.5   |  29  |  0.99    Ca    8.4<L>      16 Dec 2021 08:05          A/P: 66yMale s/p ALIF/PSF L4-5 POD#1.   D/C cher   Pain control: oxy 20/25mg trial   PT: WBAT - spinal precautions   DVT ppx: SCDs   CLD pending improved GI motility  Wound care, Isometric exercises, incentive spirometry   Medical consult appreciated  Discharge: planning home when ortho stable.   All the above discussed and understood by pt

## 2021-12-17 LAB
ANION GAP SERPL CALC-SCNC: 1 MMOL/L — LOW (ref 5–17)
BASOPHILS # BLD AUTO: 0.03 K/UL — SIGNIFICANT CHANGE UP (ref 0–0.2)
BASOPHILS NFR BLD AUTO: 0.3 % — SIGNIFICANT CHANGE UP (ref 0–2)
BUN SERPL-MCNC: 17 MG/DL — SIGNIFICANT CHANGE UP (ref 7–23)
CALCIUM SERPL-MCNC: 8.5 MG/DL — SIGNIFICANT CHANGE UP (ref 8.5–10.1)
CHLORIDE SERPL-SCNC: 107 MMOL/L — SIGNIFICANT CHANGE UP (ref 96–108)
CO2 SERPL-SCNC: 36 MMOL/L — HIGH (ref 22–31)
CREAT SERPL-MCNC: 0.98 MG/DL — SIGNIFICANT CHANGE UP (ref 0.5–1.3)
EOSINOPHIL # BLD AUTO: 0.08 K/UL — SIGNIFICANT CHANGE UP (ref 0–0.5)
EOSINOPHIL NFR BLD AUTO: 0.8 % — SIGNIFICANT CHANGE UP (ref 0–6)
GLUCOSE SERPL-MCNC: 100 MG/DL — HIGH (ref 70–99)
HCT VFR BLD CALC: 35 % — LOW (ref 39–50)
HGB BLD-MCNC: 10.9 G/DL — LOW (ref 13–17)
IMM GRANULOCYTES NFR BLD AUTO: 0.5 % — SIGNIFICANT CHANGE UP (ref 0–1.5)
LYMPHOCYTES # BLD AUTO: 1.6 K/UL — SIGNIFICANT CHANGE UP (ref 1–3.3)
LYMPHOCYTES # BLD AUTO: 15.6 % — SIGNIFICANT CHANGE UP (ref 13–44)
MCHC RBC-ENTMCNC: 26.5 PG — LOW (ref 27–34)
MCHC RBC-ENTMCNC: 31.1 GM/DL — LOW (ref 32–36)
MCV RBC AUTO: 85 FL — SIGNIFICANT CHANGE UP (ref 80–100)
MONOCYTES # BLD AUTO: 1.52 K/UL — HIGH (ref 0–0.9)
MONOCYTES NFR BLD AUTO: 14.8 % — HIGH (ref 2–14)
NEUTROPHILS # BLD AUTO: 6.97 K/UL — SIGNIFICANT CHANGE UP (ref 1.8–7.4)
NEUTROPHILS NFR BLD AUTO: 68 % — SIGNIFICANT CHANGE UP (ref 43–77)
NRBC # BLD: 0 /100 WBCS — SIGNIFICANT CHANGE UP (ref 0–0)
PLATELET # BLD AUTO: 229 K/UL — SIGNIFICANT CHANGE UP (ref 150–400)
POTASSIUM SERPL-MCNC: 4.9 MMOL/L — SIGNIFICANT CHANGE UP (ref 3.5–5.3)
POTASSIUM SERPL-SCNC: 4.9 MMOL/L — SIGNIFICANT CHANGE UP (ref 3.5–5.3)
RBC # BLD: 4.12 M/UL — LOW (ref 4.2–5.8)
RBC # FLD: 14.6 % — HIGH (ref 10.3–14.5)
SODIUM SERPL-SCNC: 144 MMOL/L — SIGNIFICANT CHANGE UP (ref 135–145)
WBC # BLD: 10.25 K/UL — SIGNIFICANT CHANGE UP (ref 3.8–10.5)
WBC # FLD AUTO: 10.25 K/UL — SIGNIFICANT CHANGE UP (ref 3.8–10.5)

## 2021-12-17 RX ADMIN — Medication 975 MILLIGRAM(S): at 22:06

## 2021-12-17 RX ADMIN — SODIUM CHLORIDE 75 MILLILITER(S): 9 INJECTION, SOLUTION INTRAVENOUS at 13:15

## 2021-12-17 RX ADMIN — Medication 10 MILLIGRAM(S): at 05:12

## 2021-12-17 RX ADMIN — LOSARTAN POTASSIUM 100 MILLIGRAM(S): 100 TABLET, FILM COATED ORAL at 05:12

## 2021-12-17 RX ADMIN — Medication 975 MILLIGRAM(S): at 05:13

## 2021-12-17 RX ADMIN — AMLODIPINE BESYLATE 5 MILLIGRAM(S): 2.5 TABLET ORAL at 05:12

## 2021-12-17 RX ADMIN — Medication 150 MILLIGRAM(S): at 05:12

## 2021-12-17 RX ADMIN — Medication 10 MILLIGRAM(S): at 01:15

## 2021-12-17 RX ADMIN — Medication 10 MILLIGRAM(S): at 17:41

## 2021-12-17 RX ADMIN — HYDROMORPHONE HYDROCHLORIDE 0.5 MILLIGRAM(S): 2 INJECTION INTRAMUSCULAR; INTRAVENOUS; SUBCUTANEOUS at 05:38

## 2021-12-17 RX ADMIN — Medication 975 MILLIGRAM(S): at 21:22

## 2021-12-17 RX ADMIN — Medication 975 MILLIGRAM(S): at 05:35

## 2021-12-17 RX ADMIN — Medication 10 MILLIGRAM(S): at 11:01

## 2021-12-17 RX ADMIN — OXYCODONE HYDROCHLORIDE 20 MILLIGRAM(S): 5 TABLET ORAL at 10:54

## 2021-12-17 RX ADMIN — POLYETHYLENE GLYCOL 3350 17 GRAM(S): 17 POWDER, FOR SOLUTION ORAL at 05:12

## 2021-12-17 RX ADMIN — POLYETHYLENE GLYCOL 3350 17 GRAM(S): 17 POWDER, FOR SOLUTION ORAL at 17:42

## 2021-12-17 RX ADMIN — OXYCODONE HYDROCHLORIDE 20 MILLIGRAM(S): 5 TABLET ORAL at 16:15

## 2021-12-17 RX ADMIN — OXYCODONE HYDROCHLORIDE 20 MILLIGRAM(S): 5 TABLET ORAL at 09:54

## 2021-12-17 RX ADMIN — OXYCODONE HYDROCHLORIDE 25 MILLIGRAM(S): 5 TABLET ORAL at 14:15

## 2021-12-17 RX ADMIN — Medication 5 MILLIGRAM(S): at 05:12

## 2021-12-17 RX ADMIN — OXYCODONE HYDROCHLORIDE 20 MILLIGRAM(S): 5 TABLET ORAL at 17:15

## 2021-12-17 RX ADMIN — HYDROMORPHONE HYDROCHLORIDE 0.5 MILLIGRAM(S): 2 INJECTION INTRAMUSCULAR; INTRAVENOUS; SUBCUTANEOUS at 04:31

## 2021-12-17 RX ADMIN — Medication 150 MILLIGRAM(S): at 17:41

## 2021-12-17 RX ADMIN — OXYCODONE HYDROCHLORIDE 25 MILLIGRAM(S): 5 TABLET ORAL at 18:26

## 2021-12-17 RX ADMIN — OXYCODONE HYDROCHLORIDE 25 MILLIGRAM(S): 5 TABLET ORAL at 13:15

## 2021-12-17 RX ADMIN — Medication 975 MILLIGRAM(S): at 00:34

## 2021-12-17 RX ADMIN — HYDROMORPHONE HYDROCHLORIDE 0.5 MILLIGRAM(S): 2 INJECTION INTRAMUSCULAR; INTRAVENOUS; SUBCUTANEOUS at 00:36

## 2021-12-17 RX ADMIN — OXYCODONE HYDROCHLORIDE 25 MILLIGRAM(S): 5 TABLET ORAL at 19:27

## 2021-12-17 RX ADMIN — SENNA PLUS 2 TABLET(S): 8.6 TABLET ORAL at 21:22

## 2021-12-17 RX ADMIN — HYDROMORPHONE HYDROCHLORIDE 0.5 MILLIGRAM(S): 2 INJECTION INTRAMUSCULAR; INTRAVENOUS; SUBCUTANEOUS at 23:53

## 2021-12-17 RX ADMIN — Medication 5 MILLIGRAM(S): at 17:41

## 2021-12-17 NOTE — PROGRESS NOTE ADULT - SUBJECTIVE AND OBJECTIVE BOX
66yMale s/p  ALIF/PSF L4-5 POD#2. Pt seen and examined in NAD. Pain better controlled today.  Pt denies any new complaints. Pt denies CP/SOB/N/V/D/numbness/tingling/bowel or bladder dysfunction. Scant flatus.     PE:   Neuro: AAOX3  Abd: Dressing LLQ C/D/I. Incision: staples: C/D/I.  soft, tympanic. Distended. +TTP LLQ  Spine: Dressing c/d/i Incision: Prineo dressings intact. No collection/fluctuance/drainage.   B/L UE: Skin intact. +ROM shoulder/elbow/wrist/fingers. +ok/thumbsup/fingercross signs.  strength: 5/5.  RP2+ NVI.   B/L LE: Skin intact. +ROM hip/knee/ankle/toes. Ankle Dorsi/plantarflexion: 5/5. Calf: soft, compressible and nontender. DP/PT 2+ NVI.                             10.9   10.25 )-----------( 229      ( 17 Dec 2021 07:44 )             35.0       12-17    144  |  107  |  17  ----------------------------<  100<H>  4.9   |  36<H>  |  0.98    Ca    8.5      17 Dec 2021 07:44          A/P: 66yMale s/p ALIF/PSF L4-5 POD#2  Dressing changed - gauze/tagaderm dressings applied.   Pain controlled  F/U flatus. On CLD. Advance diet pending improved GI function   PT: WBAT - spinal precautions   DVT ppx: SCDs   Wound care, Isometric exercises, incentive spirometry   Discharge: planning home pending diet.   All the above discussed and understood by pt

## 2021-12-17 NOTE — DIETITIAN INITIAL EVALUATION ADULT. - PERTINENT MEDS FT
MEDICATIONS  (STANDING):  acetaminophen     Tablet .. 975 milliGRAM(s) Oral every 8 hours  amLODIPine   Tablet 5 milliGRAM(s) Oral daily  bisacodyl 5 milliGRAM(s) Oral every 12 hours  lactated ringers. 1000 milliLiter(s) (75 mL/Hr) IV Continuous <Continuous>  losartan 100 milliGRAM(s) Oral daily  metoclopramide Injectable 10 milliGRAM(s) IV Push every 6 hours  polyethylene glycol 3350 17 Gram(s) Oral two times a day  pregabalin 150 milliGRAM(s) Oral two times a day  senna 2 Tablet(s) Oral at bedtime    MEDICATIONS  (PRN):  cyclobenzaprine 10 milliGRAM(s) Oral every 8 hours PRN Muscle Spasm  diphenhydrAMINE Injectable 12.5 milliGRAM(s) IV Push every 4 hours PRN Itching  HYDROmorphone  Injectable 0.5 milliGRAM(s) IV Push every 3 hours PRN breakthroguh pain  metoclopramide Injectable 10 milliGRAM(s) IV Push once PRN Nausea and/or Vomiting  oxyCODONE    IR 20 milliGRAM(s) Oral every 4 hours PRN pain 0-5  oxyCODONE    IR 25 milliGRAM(s) Oral every 4 hours PRN pain 6-10

## 2021-12-17 NOTE — DIETITIAN INITIAL EVALUATION ADULT. - OTHER CALCULATIONS
Ht (cm): 180.3cm   Wt (kg): 97.5kg (dosing weight 12/15)   BMI: 30.0     IBW: 78kg +/- 10% %IBW: 125% UBW: 136kg %UBW: 72%

## 2021-12-17 NOTE — DIETITIAN INITIAL EVALUATION ADULT. - OTHER INFO
Pt reports he is tolerating full liquid diet. Denies difficulty chewing/swallowing. Pt denies nausea, vomiting, diarrhea; staters no BM since before surgery (12/15); noted on bowel regimen and amenable to prune juice- diet office notified. Pt reports he had gastric sleeve surgery a few years ago; states he is unsure of total weight loss but states his weight pre-bariatric surgery was 300 pounds and it went as low as 178 pounds- states since then he has regained some weight back.  Pt with no nutrition related questions or preferences at this time. Made aware RD remains available.

## 2021-12-18 LAB
ANION GAP SERPL CALC-SCNC: 4 MMOL/L — LOW (ref 5–17)
BASOPHILS # BLD AUTO: 0.04 K/UL — SIGNIFICANT CHANGE UP (ref 0–0.2)
BASOPHILS NFR BLD AUTO: 0.4 % — SIGNIFICANT CHANGE UP (ref 0–2)
BUN SERPL-MCNC: 15 MG/DL — SIGNIFICANT CHANGE UP (ref 7–23)
CALCIUM SERPL-MCNC: 8.5 MG/DL — SIGNIFICANT CHANGE UP (ref 8.5–10.1)
CHLORIDE SERPL-SCNC: 103 MMOL/L — SIGNIFICANT CHANGE UP (ref 96–108)
CO2 SERPL-SCNC: 33 MMOL/L — HIGH (ref 22–31)
CREAT SERPL-MCNC: 0.98 MG/DL — SIGNIFICANT CHANGE UP (ref 0.5–1.3)
EOSINOPHIL # BLD AUTO: 0.29 K/UL — SIGNIFICANT CHANGE UP (ref 0–0.5)
EOSINOPHIL NFR BLD AUTO: 3 % — SIGNIFICANT CHANGE UP (ref 0–6)
GLUCOSE SERPL-MCNC: 106 MG/DL — HIGH (ref 70–99)
HCT VFR BLD CALC: 35.5 % — LOW (ref 39–50)
HGB BLD-MCNC: 11 G/DL — LOW (ref 13–17)
IMM GRANULOCYTES NFR BLD AUTO: 0.5 % — SIGNIFICANT CHANGE UP (ref 0–1.5)
LYMPHOCYTES # BLD AUTO: 1.45 K/UL — SIGNIFICANT CHANGE UP (ref 1–3.3)
LYMPHOCYTES # BLD AUTO: 15 % — SIGNIFICANT CHANGE UP (ref 13–44)
MCHC RBC-ENTMCNC: 26.4 PG — LOW (ref 27–34)
MCHC RBC-ENTMCNC: 31 GM/DL — LOW (ref 32–36)
MCV RBC AUTO: 85.3 FL — SIGNIFICANT CHANGE UP (ref 80–100)
MONOCYTES # BLD AUTO: 1.43 K/UL — HIGH (ref 0–0.9)
MONOCYTES NFR BLD AUTO: 14.8 % — HIGH (ref 2–14)
NEUTROPHILS # BLD AUTO: 6.38 K/UL — SIGNIFICANT CHANGE UP (ref 1.8–7.4)
NEUTROPHILS NFR BLD AUTO: 66.3 % — SIGNIFICANT CHANGE UP (ref 43–77)
NRBC # BLD: 0 /100 WBCS — SIGNIFICANT CHANGE UP (ref 0–0)
PLATELET # BLD AUTO: 233 K/UL — SIGNIFICANT CHANGE UP (ref 150–400)
POTASSIUM SERPL-MCNC: 4.7 MMOL/L — SIGNIFICANT CHANGE UP (ref 3.5–5.3)
POTASSIUM SERPL-SCNC: 4.7 MMOL/L — SIGNIFICANT CHANGE UP (ref 3.5–5.3)
RBC # BLD: 4.16 M/UL — LOW (ref 4.2–5.8)
RBC # FLD: 14.4 % — SIGNIFICANT CHANGE UP (ref 10.3–14.5)
SODIUM SERPL-SCNC: 140 MMOL/L — SIGNIFICANT CHANGE UP (ref 135–145)
WBC # BLD: 9.64 K/UL — SIGNIFICANT CHANGE UP (ref 3.8–10.5)
WBC # FLD AUTO: 9.64 K/UL — SIGNIFICANT CHANGE UP (ref 3.8–10.5)

## 2021-12-18 RX ORDER — CALCIUM CARBONATE 500(1250)
1 TABLET ORAL
Refills: 0 | Status: DISCONTINUED | OUTPATIENT
Start: 2021-12-18 | End: 2021-12-19

## 2021-12-18 RX ORDER — SENNA PLUS 8.6 MG/1
2 TABLET ORAL
Qty: 0 | Refills: 0 | DISCHARGE
Start: 2021-12-18

## 2021-12-18 RX ORDER — OXYCODONE HYDROCHLORIDE 5 MG/1
1 TABLET ORAL
Qty: 30 | Refills: 0
Start: 2021-12-18 | End: 2021-12-22

## 2021-12-18 RX ORDER — OXYCODONE HYDROCHLORIDE 5 MG/1
1 TABLET ORAL
Qty: 0 | Refills: 0 | DISCHARGE

## 2021-12-18 RX ORDER — CYCLOBENZAPRINE HYDROCHLORIDE 10 MG/1
1 TABLET, FILM COATED ORAL
Qty: 15 | Refills: 0
Start: 2021-12-18 | End: 2021-12-22

## 2021-12-18 RX ORDER — POLYETHYLENE GLYCOL 3350 17 G/17G
17 POWDER, FOR SOLUTION ORAL
Qty: 0 | Refills: 0 | DISCHARGE
Start: 2021-12-18

## 2021-12-18 RX ADMIN — Medication 975 MILLIGRAM(S): at 21:35

## 2021-12-18 RX ADMIN — Medication 5 MILLIGRAM(S): at 17:37

## 2021-12-18 RX ADMIN — HYDROMORPHONE HYDROCHLORIDE 0.5 MILLIGRAM(S): 2 INJECTION INTRAMUSCULAR; INTRAVENOUS; SUBCUTANEOUS at 00:45

## 2021-12-18 RX ADMIN — OXYCODONE HYDROCHLORIDE 25 MILLIGRAM(S): 5 TABLET ORAL at 09:50

## 2021-12-18 RX ADMIN — OXYCODONE HYDROCHLORIDE 20 MILLIGRAM(S): 5 TABLET ORAL at 13:18

## 2021-12-18 RX ADMIN — LOSARTAN POTASSIUM 100 MILLIGRAM(S): 100 TABLET, FILM COATED ORAL at 05:16

## 2021-12-18 RX ADMIN — Medication 150 MILLIGRAM(S): at 17:37

## 2021-12-18 RX ADMIN — OXYCODONE HYDROCHLORIDE 25 MILLIGRAM(S): 5 TABLET ORAL at 08:50

## 2021-12-18 RX ADMIN — OXYCODONE HYDROCHLORIDE 20 MILLIGRAM(S): 5 TABLET ORAL at 19:38

## 2021-12-18 RX ADMIN — OXYCODONE HYDROCHLORIDE 25 MILLIGRAM(S): 5 TABLET ORAL at 17:37

## 2021-12-18 RX ADMIN — OXYCODONE HYDROCHLORIDE 20 MILLIGRAM(S): 5 TABLET ORAL at 14:18

## 2021-12-18 RX ADMIN — SENNA PLUS 2 TABLET(S): 8.6 TABLET ORAL at 21:35

## 2021-12-18 RX ADMIN — AMLODIPINE BESYLATE 5 MILLIGRAM(S): 2.5 TABLET ORAL at 05:06

## 2021-12-18 RX ADMIN — OXYCODONE HYDROCHLORIDE 25 MILLIGRAM(S): 5 TABLET ORAL at 18:50

## 2021-12-18 RX ADMIN — HYDROMORPHONE HYDROCHLORIDE 0.5 MILLIGRAM(S): 2 INJECTION INTRAMUSCULAR; INTRAVENOUS; SUBCUTANEOUS at 06:16

## 2021-12-18 RX ADMIN — POLYETHYLENE GLYCOL 3350 17 GRAM(S): 17 POWDER, FOR SOLUTION ORAL at 05:08

## 2021-12-18 RX ADMIN — OXYCODONE HYDROCHLORIDE 20 MILLIGRAM(S): 5 TABLET ORAL at 21:04

## 2021-12-18 RX ADMIN — Medication 150 MILLIGRAM(S): at 05:06

## 2021-12-18 RX ADMIN — Medication 5 MILLIGRAM(S): at 05:06

## 2021-12-18 RX ADMIN — Medication 975 MILLIGRAM(S): at 05:06

## 2021-12-18 RX ADMIN — HYDROMORPHONE HYDROCHLORIDE 0.5 MILLIGRAM(S): 2 INJECTION INTRAMUSCULAR; INTRAVENOUS; SUBCUTANEOUS at 05:09

## 2021-12-18 RX ADMIN — Medication 975 MILLIGRAM(S): at 22:30

## 2021-12-18 RX ADMIN — POLYETHYLENE GLYCOL 3350 17 GRAM(S): 17 POWDER, FOR SOLUTION ORAL at 17:37

## 2021-12-18 RX ADMIN — CYCLOBENZAPRINE HYDROCHLORIDE 10 MILLIGRAM(S): 10 TABLET, FILM COATED ORAL at 21:35

## 2021-12-18 NOTE — PROGRESS NOTE ADULT - SUBJECTIVE AND OBJECTIVE BOX
66yMale s/p ALIF/PSF POD#3  Pt seen and examined in NAD.   Pain controlled.  Pt denies any new complaints.   Pt denies CP/SOB/N/V/D/numbness/tingling/bowel or bladder dysfunction.   +flatus    Vital Signs Last 24 Hrs  T(C): 36.8 (18 Dec 2021 05:34), Max: 36.8 (18 Dec 2021 05:34)  T(F): 98.3 (18 Dec 2021 05:34), Max: 98.3 (18 Dec 2021 05:34)  HR: 78 (18 Dec 2021 05:34) (77 - 83)  BP: 159/85 (18 Dec 2021 05:34) (123/70 - 159/85)  BP(mean): --  RR: 18 (18 Dec 2021 05:34) (17 - 19)  SpO2: 95% (18 Dec 2021 05:34) (93% - 98%)    PE:   General: A&Ox3, NAD  Spine: Dressing c/d/i   Abd: Dressing CDI. NT/ND  B/L UE: Skin intact. +ROM shoulder/elbow/wrist/fingers. +ok/thumbsup/fingercross signs.  strength: 5/5.  RP2+ NVI.   B/L LE: Skin intact. +ROM hip/knee/ankle/toes. Ankle Dorsi/plantarflexion: 5/5. Calf: soft, compressible and nontender. DP/PT 2+ NVI.                             11.0   9.64  )-----------( 233      ( 18 Dec 2021 07:46 )             35.5       12-18    140  |  103  |  15  ----------------------------<  106<H>  4.7   |  33<H>  |  0.98    Ca    8.5      18 Dec 2021 07:46          A/P: 66yMale s/p ALIF/PSF POD#3  Passing flatus: will advance to regular diet  Wound care  Pain controlled: Oxy 20/25  PT: WBAT: Spinal precautions  Isometric exercises  DVT ppx: SCDs  Incentive spirometry counseled   Discharge: planning   All the above discussed and understood by pt

## 2021-12-19 VITALS
SYSTOLIC BLOOD PRESSURE: 114 MMHG | DIASTOLIC BLOOD PRESSURE: 77 MMHG | OXYGEN SATURATION: 94 % | RESPIRATION RATE: 17 BRPM | TEMPERATURE: 98 F | HEART RATE: 99 BPM

## 2021-12-19 LAB
ANION GAP SERPL CALC-SCNC: 1 MMOL/L — LOW (ref 5–17)
BUN SERPL-MCNC: 17 MG/DL — SIGNIFICANT CHANGE UP (ref 7–23)
CALCIUM SERPL-MCNC: 8.9 MG/DL — SIGNIFICANT CHANGE UP (ref 8.5–10.1)
CHLORIDE SERPL-SCNC: 105 MMOL/L — SIGNIFICANT CHANGE UP (ref 96–108)
CO2 SERPL-SCNC: 36 MMOL/L — HIGH (ref 22–31)
CREAT SERPL-MCNC: 1.07 MG/DL — SIGNIFICANT CHANGE UP (ref 0.5–1.3)
GLUCOSE SERPL-MCNC: 117 MG/DL — HIGH (ref 70–99)
HCT VFR BLD CALC: 40.6 % — SIGNIFICANT CHANGE UP (ref 39–50)
HGB BLD-MCNC: 12.4 G/DL — LOW (ref 13–17)
MCHC RBC-ENTMCNC: 26.1 PG — LOW (ref 27–34)
MCHC RBC-ENTMCNC: 30.5 GM/DL — LOW (ref 32–36)
MCV RBC AUTO: 85.3 FL — SIGNIFICANT CHANGE UP (ref 80–100)
NRBC # BLD: 0 /100 WBCS — SIGNIFICANT CHANGE UP (ref 0–0)
PLATELET # BLD AUTO: 311 K/UL — SIGNIFICANT CHANGE UP (ref 150–400)
POTASSIUM SERPL-MCNC: 4.7 MMOL/L — SIGNIFICANT CHANGE UP (ref 3.5–5.3)
POTASSIUM SERPL-SCNC: 4.7 MMOL/L — SIGNIFICANT CHANGE UP (ref 3.5–5.3)
RBC # BLD: 4.76 M/UL — SIGNIFICANT CHANGE UP (ref 4.2–5.8)
RBC # FLD: 14 % — SIGNIFICANT CHANGE UP (ref 10.3–14.5)
SODIUM SERPL-SCNC: 142 MMOL/L — SIGNIFICANT CHANGE UP (ref 135–145)
WBC # BLD: 10.08 K/UL — SIGNIFICANT CHANGE UP (ref 3.8–10.5)
WBC # FLD AUTO: 10.08 K/UL — SIGNIFICANT CHANGE UP (ref 3.8–10.5)

## 2021-12-19 RX ADMIN — LOSARTAN POTASSIUM 100 MILLIGRAM(S): 100 TABLET, FILM COATED ORAL at 05:29

## 2021-12-19 RX ADMIN — POLYETHYLENE GLYCOL 3350 17 GRAM(S): 17 POWDER, FOR SOLUTION ORAL at 05:30

## 2021-12-19 RX ADMIN — OXYCODONE HYDROCHLORIDE 20 MILLIGRAM(S): 5 TABLET ORAL at 10:07

## 2021-12-19 RX ADMIN — OXYCODONE HYDROCHLORIDE 20 MILLIGRAM(S): 5 TABLET ORAL at 05:57

## 2021-12-19 RX ADMIN — HYDROMORPHONE HYDROCHLORIDE 0.5 MILLIGRAM(S): 2 INJECTION INTRAMUSCULAR; INTRAVENOUS; SUBCUTANEOUS at 12:41

## 2021-12-19 RX ADMIN — Medication 150 MILLIGRAM(S): at 05:27

## 2021-12-19 RX ADMIN — AMLODIPINE BESYLATE 5 MILLIGRAM(S): 2.5 TABLET ORAL at 05:29

## 2021-12-19 RX ADMIN — OXYCODONE HYDROCHLORIDE 20 MILLIGRAM(S): 5 TABLET ORAL at 05:27

## 2021-12-19 RX ADMIN — HYDROMORPHONE HYDROCHLORIDE 0.5 MILLIGRAM(S): 2 INJECTION INTRAMUSCULAR; INTRAVENOUS; SUBCUTANEOUS at 12:50

## 2021-12-19 RX ADMIN — OXYCODONE HYDROCHLORIDE 20 MILLIGRAM(S): 5 TABLET ORAL at 10:47

## 2021-12-19 NOTE — PROGRESS NOTE ADULT - SUBJECTIVE AND OBJECTIVE BOX
66yMale s/p ALIF/PSF POD#4  Pt seen and examined in NAD.   Pain controlled.  Pt denies any new complaints.   Pt denies CP/SOB/N/V/D/numbness/tingling/bowel or bladder dysfunction.   +flatus    Vital Signs Last 24 Hrs  T(C): 36.8 (18 Dec 2021 05:34), Max: 36.8 (18 Dec 2021 05:34)  T(F): 98.3 (18 Dec 2021 05:34), Max: 98.3 (18 Dec 2021 05:34)  HR: 78 (18 Dec 2021 05:34) (77 - 83)  BP: 159/85 (18 Dec 2021 05:34) (123/70 - 159/85)  BP(mean): --  RR: 18 (18 Dec 2021 05:34) (17 - 19)  SpO2: 95% (18 Dec 2021 05:34) (93% - 98%)    PE:   General: A&Ox3, NAD  Spine: Dressing c/d/i   Abd: Dressing CDI. NT/ND  B/L UE: Skin intact. +ROM shoulder/elbow/wrist/fingers. +ok/thumbsup/fingercross signs.  strength: 5/5.  RP2+ NVI.   B/L LE: Skin intact. +ROM hip/knee/ankle/toes. Ankle Dorsi/plantarflexion: 5/5. Calf: soft, compressible and nontender. DP/PT 2+ NVI.                             11.0   9.64  )-----------( 233      ( 18 Dec 2021 07:46 )             35.5       12-18    140  |  103  |  15  ----------------------------<  106<H>  4.7   |  33<H>  |  0.98    Ca    8.5      18 Dec 2021 07:46          A/P: 66yMale s/p ALIF/PSF POD#4  Passing flatus: will advance to regular diet  Wound care  Pain controlled: Oxy 20/25  PT: WBAT: Spinal precautions  Isometric exercises  DVT ppx: SCDs  Incentive spirometry counseled   Discharge: planning   All the above discussed and understood by pt    66yMale s/p ALIF/PSF POD#4  Pt seen and examined in NAD.   Pain controlled.  Pt denies any new complaints.   Pt denies CP/SOB/N/V/D/numbness/tingling/bowel or bladder dysfunction.   +flatus        PE:   General: A&Ox3, NAD  Spine: Dressing c/d/i   Abd: Dressing CDI. NT/ND  B/L UE: Skin intact. +ROM shoulder/elbow/wrist/fingers. +ok/thumbsup/fingercross signs.  strength: 5/5.  RP2+ NVI.   B/L LE: Skin intact. +ROM hip/knee/ankle/toes. Ankle Dorsi/plantarflexion: 5/5. Calf: soft, compressible and nontender. DP/PT 2+ NVI.           VITAL SIGNS:  T(C): 36.7 (12-19-21 @ 05:22), Max: 37.1 (12-18-21 @ 18:33)  HR: 100 (12-19-21 @ 05:22) (79 - 100)  BP: 136/94 (12-19-21 @ 05:22) (136/94 - 164/74)  RR: 18 (12-19-21 @ 05:22) (17 - 18)  SpO2: 96% (12-19-21 @ 05:22) (94% - 98%)      LABS:                        11.0   9.64  )-----------( 233      ( 18 Dec 2021 07:46 )             35.5     12-18    140  |  103  |  15  ----------------------------<  106<H>  4.7   |  33<H>  |  0.98    Ca    8.5      18 Dec 2021 07:46                             A/P: 66yMale s/p ALIF/PSF POD#4  Passing flatus: regular diet  Wound care  Pain controlled: Oxy 20/25  PT: WBAT: Spinal precautions  Isometric exercises  DVT ppx: SCDs  Incentive spirometry counseled   Discharge: planning   All the above discussed and understood by pt

## 2021-12-22 DIAGNOSIS — M96.0 PSEUDARTHROSIS AFTER FUSION OR ARTHRODESIS: ICD-10-CM

## 2021-12-22 DIAGNOSIS — Z86.16 PERSONAL HISTORY OF COVID-19: ICD-10-CM

## 2021-12-22 DIAGNOSIS — M54.16 RADICULOPATHY, LUMBAR REGION: ICD-10-CM

## 2021-12-22 DIAGNOSIS — Z87.891 PERSONAL HISTORY OF NICOTINE DEPENDENCE: ICD-10-CM

## 2021-12-22 DIAGNOSIS — N40.0 BENIGN PROSTATIC HYPERPLASIA WITHOUT LOWER URINARY TRACT SYMPTOMS: ICD-10-CM

## 2021-12-22 DIAGNOSIS — I10 ESSENTIAL (PRIMARY) HYPERTENSION: ICD-10-CM

## 2021-12-22 DIAGNOSIS — K21.9 GASTRO-ESOPHAGEAL REFLUX DISEASE WITHOUT ESOPHAGITIS: ICD-10-CM

## 2021-12-22 DIAGNOSIS — E11.9 TYPE 2 DIABETES MELLITUS WITHOUT COMPLICATIONS: ICD-10-CM

## 2022-03-27 PROBLEM — E11.9 TYPE 2 DIABETES MELLITUS WITHOUT COMPLICATIONS: Chronic | Status: ACTIVE | Noted: 2021-11-29

## 2022-04-04 ENCOUNTER — FORM ENCOUNTER (OUTPATIENT)
Age: 67
End: 2022-04-04

## 2022-04-05 ENCOUNTER — FORM ENCOUNTER (OUTPATIENT)
Age: 67
End: 2022-04-05

## 2022-04-19 ENCOUNTER — OUTPATIENT (OUTPATIENT)
Dept: OUTPATIENT SERVICES | Facility: HOSPITAL | Age: 67
LOS: 1 days | Discharge: HOME | End: 2022-04-19
Payer: COMMERCIAL

## 2022-04-19 DIAGNOSIS — M48.02 SPINAL STENOSIS, CERVICAL REGION: ICD-10-CM

## 2022-04-19 DIAGNOSIS — Z01.818 ENCOUNTER FOR OTHER PREPROCEDURAL EXAMINATION: ICD-10-CM

## 2022-04-19 DIAGNOSIS — G95.20 UNSPECIFIED CORD COMPRESSION: ICD-10-CM

## 2022-04-19 DIAGNOSIS — Z98.890 OTHER SPECIFIED POSTPROCEDURAL STATES: Chronic | ICD-10-CM

## 2022-04-19 DIAGNOSIS — Z98.1 ARTHRODESIS STATUS: Chronic | ICD-10-CM

## 2022-04-19 DIAGNOSIS — M54.12 RADICULOPATHY, CERVICAL REGION: ICD-10-CM

## 2022-04-19 DIAGNOSIS — I10 ESSENTIAL (PRIMARY) HYPERTENSION: ICD-10-CM

## 2022-04-19 LAB
A1C WITH ESTIMATED AVERAGE GLUCOSE RESULT: 6.1 % — HIGH (ref 4–5.6)
ALBUMIN SERPL ELPH-MCNC: 3.3 G/DL — SIGNIFICANT CHANGE UP (ref 3.3–5)
ALP SERPL-CCNC: 106 U/L — SIGNIFICANT CHANGE UP (ref 40–120)
ALT FLD-CCNC: 25 U/L — SIGNIFICANT CHANGE UP (ref 12–78)
ANION GAP SERPL CALC-SCNC: 4 MMOL/L — LOW (ref 5–17)
APTT BLD: 32.3 SEC — SIGNIFICANT CHANGE UP (ref 27.5–35.5)
AST SERPL-CCNC: 21 U/L — SIGNIFICANT CHANGE UP (ref 15–37)
BASOPHILS # BLD AUTO: 0.05 K/UL — SIGNIFICANT CHANGE UP (ref 0–0.2)
BASOPHILS NFR BLD AUTO: 0.5 % — SIGNIFICANT CHANGE UP (ref 0–2)
BILIRUB SERPL-MCNC: 0.8 MG/DL — SIGNIFICANT CHANGE UP (ref 0.2–1.2)
BUN SERPL-MCNC: 20 MG/DL — SIGNIFICANT CHANGE UP (ref 7–23)
CALCIUM SERPL-MCNC: 8.7 MG/DL — SIGNIFICANT CHANGE UP (ref 8.5–10.1)
CHLORIDE SERPL-SCNC: 106 MMOL/L — SIGNIFICANT CHANGE UP (ref 96–108)
CO2 SERPL-SCNC: 31 MMOL/L — SIGNIFICANT CHANGE UP (ref 22–31)
CREAT SERPL-MCNC: 1.29 MG/DL — SIGNIFICANT CHANGE UP (ref 0.5–1.3)
EGFR: 61 ML/MIN/1.73M2 — SIGNIFICANT CHANGE UP
EOSINOPHIL # BLD AUTO: 0.32 K/UL — SIGNIFICANT CHANGE UP (ref 0–0.5)
EOSINOPHIL NFR BLD AUTO: 3.5 % — SIGNIFICANT CHANGE UP (ref 0–6)
ESTIMATED AVERAGE GLUCOSE: 128 MG/DL — HIGH (ref 68–114)
GLUCOSE SERPL-MCNC: 110 MG/DL — HIGH (ref 70–99)
HCT VFR BLD CALC: 43 % — SIGNIFICANT CHANGE UP (ref 39–50)
HGB BLD-MCNC: 13.2 G/DL — SIGNIFICANT CHANGE UP (ref 13–17)
IMM GRANULOCYTES NFR BLD AUTO: 0.6 % — SIGNIFICANT CHANGE UP (ref 0–1.5)
INR BLD: 0.9 RATIO — SIGNIFICANT CHANGE UP (ref 0.88–1.16)
LYMPHOCYTES # BLD AUTO: 1.53 K/UL — SIGNIFICANT CHANGE UP (ref 1–3.3)
LYMPHOCYTES # BLD AUTO: 16.5 % — SIGNIFICANT CHANGE UP (ref 13–44)
MCHC RBC-ENTMCNC: 24.8 PG — LOW (ref 27–34)
MCHC RBC-ENTMCNC: 30.7 G/DL — LOW (ref 32–36)
MCV RBC AUTO: 80.8 FL — SIGNIFICANT CHANGE UP (ref 80–100)
MONOCYTES # BLD AUTO: 0.97 K/UL — HIGH (ref 0–0.9)
MONOCYTES NFR BLD AUTO: 10.5 % — SIGNIFICANT CHANGE UP (ref 2–14)
NEUTROPHILS # BLD AUTO: 6.32 K/UL — SIGNIFICANT CHANGE UP (ref 1.8–7.4)
NEUTROPHILS NFR BLD AUTO: 68.4 % — SIGNIFICANT CHANGE UP (ref 43–77)
NRBC # BLD: 0 /100 WBCS — SIGNIFICANT CHANGE UP (ref 0–0)
PLATELET # BLD AUTO: 349 K/UL — SIGNIFICANT CHANGE UP (ref 150–400)
POTASSIUM SERPL-MCNC: 4.6 MMOL/L — SIGNIFICANT CHANGE UP (ref 3.5–5.3)
POTASSIUM SERPL-SCNC: 4.6 MMOL/L — SIGNIFICANT CHANGE UP (ref 3.5–5.3)
PROT SERPL-MCNC: 7.5 GM/DL — SIGNIFICANT CHANGE UP (ref 6–8.3)
PROTHROM AB SERPL-ACNC: 10.8 SEC — SIGNIFICANT CHANGE UP (ref 10.5–13.4)
RBC # BLD: 5.32 M/UL — SIGNIFICANT CHANGE UP (ref 4.2–5.8)
RBC # FLD: 15.5 % — HIGH (ref 10.3–14.5)
SODIUM SERPL-SCNC: 141 MMOL/L — SIGNIFICANT CHANGE UP (ref 135–145)
WBC # BLD: 9.25 K/UL — SIGNIFICANT CHANGE UP (ref 3.8–10.5)
WBC # FLD AUTO: 9.25 K/UL — SIGNIFICANT CHANGE UP (ref 3.8–10.5)

## 2022-04-19 PROCEDURE — 93010 ELECTROCARDIOGRAM REPORT: CPT

## 2022-04-19 NOTE — H&P PST ADULT - ASSESSMENT
cervical compression   CAPRINI SCORE    AGE RELATED RISK FACTORS                                                       MOBILITY RELATED FACTORS  [ ] Age 41-60 years                                            (1 Point)                  [ ] Bed rest                                                        (1 Point)  x[ ] Age: 61-74 years                                           (2 Points)                [ ] Plaster cast                                                   (2 Points)  [ ] Age= 75 years                                              (3 Points)                 [ ] Bed bound for more than 72 hours                   (2 Points)    DISEASE RELATED RISK FACTORS                                               GENDER SPECIFIC FACTORS  [ ] Edema in the lower extremities                       (1 Point)                  [ ] Pregnancy                                                     (1 Point)  [ ] Varicose veins                                               (1 Point)                  [ ] Post-partum < 6 weeks                                   (1 Point)             [x ] BMI > 25 Kg/m2                                            (1 Point)                  [ ] Hormonal therapy  or oral contraception            (1 Point)                 [ ] Sepsis (in the previous month)                        (1 Point)                  [ ] History of pregnancy complications  [ ] Pneumonia or serious lung disease                                               [ ] Unexplained or recurrent                       (1 Point)           (in the previous month)                               (1 Point)  [ ] Abnormal pulmonary function test                     (1 Point)                 SURGERY RELATED RISK FACTORS  [ ] Acute myocardial infarction                              (1 Point)                 [ ]  Section                                            (1 Point)  [ ] Congestive heart failure (in the previous month)  (1 Point)                 [ ] Minor surgery                                                 (1 Point)   [ ] Inflammatory bowel disease                             (1 Point)                 [ x] Arthroscopic surgery                                        (2 Points)  [ ] Central venous access                                    (2 Points)                [ ] General surgery lasting more than 45 minutes   (2 Points)       [ ] Stroke (in the previous month)                          (5 Points)               [ ] Elective arthroplasty                                        (5 Points)                                                                                                                                               HEMATOLOGY RELATED FACTORS                                                 TRAUMA RELATED RISK FACTORS  [ ] Prior episodes of VTE                                     (3 Points)                 [ ] Fracture of the hip, pelvis, or leg                       (5 Points)  [ ] Positive family history for VTE                         (3 Points)                 [ ] Acute spinal cord injury (in the previous month)  (5 Points)  [ ] Prothrombin 87051 A                                      (3 Points)                 [ ] Paralysis  (less than 1 month)                          (5 Points)  [ ] Factor V Leiden                                             (3 Points)                 [ ] Multiple Trauma within 1 month                         (5 Points)  [ ] Lupus anticoagulants                                     (3 Points)                                                           [ ] Anticardiolipin antibodies                                (3 Points)                                                       [ ] High homocysteine in the blood                      (3 Points)                                             [ ] Other congenital or acquired thrombophilia       (3 Points)                                                [ ] Heparin induced thrombocytopenia                  (3 Points)                                          Total Score [     5    ]  Caprini Score 0-2: Low risk, No VTE Prophylaxis required for most patient's, encourage ambulation  Caprini Score 3-6: At Risk, Pharmacologic VTE prophylaxis is indicated for most patients ( in the absence of a contraindication)  Caprini Score Greater than or = 7: High Risk , pharmacologic VTE is indicated for most patients ( in the absence of a contraindication)    Caprini score indicates that the patient is high risk for VTE event ( score 6 or greater). Surgical patient's in this group will benefit from both pharmacologic prophylaxis and intermittent compression devices . Surgical team will determine the balance between VTE  risk and bleeding risk and other clinical considerations

## 2022-04-19 NOTE — H&P PST ADULT - HISTORY OF PRESENT ILLNESS
65 yo male  c/o neck surgery and now with numbness of hands - secondary to cerviacl compression - schedule for anterior cervical diskectomy  denies recent travels in the past 30 days. No fever, SOB, cough, flu like symptoms or body rash- covid screen

## 2022-04-19 NOTE — H&P PST ADULT - NSICDXPASTMEDICALHX_GEN_ALL_CORE_FT
PAST MEDICAL HISTORY:  DM (diabetes mellitus)     Essential hypertension     GERD (gastroesophageal reflux disease)

## 2022-04-19 NOTE — H&P PST ADULT - PROBLEM SELECTOR PLAN 3
Assessment and Plan: labs - cbc,pt/ptt,bmp,t&s,nose cx,ekg  M/C required  preop 3 day hibiclens instruction reviewed and given .instructed on if  nose cx positive use mupuricin 5 days and checklist given  take routine meds DOS with sips of water. avoid NSAID and OTC supplements. verbalized understanding  information on proper nutrition , increase protein and better food choices provided in packet  patient instructed on having covid19 swab 3 days prior to surgery

## 2022-04-20 LAB
MRSA PCR RESULT.: SIGNIFICANT CHANGE UP
S AUREUS DNA NOSE QL NAA+PROBE: SIGNIFICANT CHANGE UP
VIT D25+D1,25 OH+D1,25 PNL SERPL-MCNC: 44.6 PG/ML — SIGNIFICANT CHANGE UP (ref 19.9–79.3)

## 2022-05-03 DIAGNOSIS — M54.12 RADICULOPATHY, CERVICAL REGION: ICD-10-CM

## 2022-05-03 RX ORDER — OXYCODONE 10 MG/1
10 TABLET ORAL 4 TIMES DAILY
Qty: 120 | Refills: 0 | Status: ACTIVE | COMMUNITY
Start: 2022-05-03 | End: 1900-01-01

## 2022-05-05 ENCOUNTER — OUTPATIENT (OUTPATIENT)
Dept: OUTPATIENT SERVICES | Facility: HOSPITAL | Age: 67
LOS: 1 days | Discharge: HOME | End: 2022-05-05

## 2022-05-05 ENCOUNTER — TRANSCRIPTION ENCOUNTER (OUTPATIENT)
Age: 67
End: 2022-05-05

## 2022-05-05 DIAGNOSIS — U07.1 COVID-19: ICD-10-CM

## 2022-05-05 DIAGNOSIS — Z98.1 ARTHRODESIS STATUS: Chronic | ICD-10-CM

## 2022-05-05 DIAGNOSIS — Z98.890 OTHER SPECIFIED POSTPROCEDURAL STATES: Chronic | ICD-10-CM

## 2022-05-06 ENCOUNTER — TRANSCRIPTION ENCOUNTER (OUTPATIENT)
Age: 67
End: 2022-05-06

## 2022-05-06 ENCOUNTER — INPATIENT (INPATIENT)
Facility: HOSPITAL | Age: 67
LOS: 0 days | Discharge: HOME | End: 2022-05-07
Attending: ORTHOPAEDIC SURGERY | Admitting: ORTHOPAEDIC SURGERY

## 2022-05-06 ENCOUNTER — APPOINTMENT (OUTPATIENT)
Dept: ORTHOPEDIC SURGERY | Facility: HOSPITAL | Age: 67
End: 2022-05-06

## 2022-05-06 VITALS
OXYGEN SATURATION: 95 % | RESPIRATION RATE: 17 BRPM | SYSTOLIC BLOOD PRESSURE: 146 MMHG | DIASTOLIC BLOOD PRESSURE: 77 MMHG | WEIGHT: 225.09 LBS | HEART RATE: 76 BPM | TEMPERATURE: 98 F | HEIGHT: 72 IN

## 2022-05-06 DIAGNOSIS — Z98.890 OTHER SPECIFIED POSTPROCEDURAL STATES: Chronic | ICD-10-CM

## 2022-05-06 DIAGNOSIS — Z98.1 ARTHRODESIS STATUS: Chronic | ICD-10-CM

## 2022-05-06 LAB
ANION GAP SERPL CALC-SCNC: 5 MMOL/L — SIGNIFICANT CHANGE UP (ref 5–17)
BASOPHILS # BLD AUTO: 0.02 K/UL — SIGNIFICANT CHANGE UP (ref 0–0.2)
BASOPHILS NFR BLD AUTO: 0.2 % — SIGNIFICANT CHANGE UP (ref 0–2)
BUN SERPL-MCNC: 28 MG/DL — HIGH (ref 7–23)
CALCIUM SERPL-MCNC: 8.3 MG/DL — LOW (ref 8.5–10.1)
CHLORIDE SERPL-SCNC: 108 MMOL/L — SIGNIFICANT CHANGE UP (ref 96–108)
CO2 SERPL-SCNC: 27 MMOL/L — SIGNIFICANT CHANGE UP (ref 22–31)
CREAT SERPL-MCNC: 1.07 MG/DL — SIGNIFICANT CHANGE UP (ref 0.5–1.3)
EGFR: 77 ML/MIN/1.73M2 — SIGNIFICANT CHANGE UP
EOSINOPHIL # BLD AUTO: 0.17 K/UL — SIGNIFICANT CHANGE UP (ref 0–0.5)
EOSINOPHIL NFR BLD AUTO: 1.7 % — SIGNIFICANT CHANGE UP (ref 0–6)
GLUCOSE SERPL-MCNC: 131 MG/DL — HIGH (ref 70–99)
HCT VFR BLD CALC: 38.1 % — LOW (ref 39–50)
HGB BLD-MCNC: 12 G/DL — LOW (ref 13–17)
IMM GRANULOCYTES NFR BLD AUTO: 0.6 % — SIGNIFICANT CHANGE UP (ref 0–1.5)
LYMPHOCYTES # BLD AUTO: 1.39 K/UL — SIGNIFICANT CHANGE UP (ref 1–3.3)
LYMPHOCYTES # BLD AUTO: 14.2 % — SIGNIFICANT CHANGE UP (ref 13–44)
MCHC RBC-ENTMCNC: 26 PG — LOW (ref 27–34)
MCHC RBC-ENTMCNC: 31.5 G/DL — LOW (ref 32–36)
MCV RBC AUTO: 82.5 FL — SIGNIFICANT CHANGE UP (ref 80–100)
MONOCYTES # BLD AUTO: 0.31 K/UL — SIGNIFICANT CHANGE UP (ref 0–0.9)
MONOCYTES NFR BLD AUTO: 3.2 % — SIGNIFICANT CHANGE UP (ref 2–14)
NEUTROPHILS # BLD AUTO: 7.84 K/UL — HIGH (ref 1.8–7.4)
NEUTROPHILS NFR BLD AUTO: 80.1 % — HIGH (ref 43–77)
NRBC # BLD: 0 /100 WBCS — SIGNIFICANT CHANGE UP (ref 0–0)
PLATELET # BLD AUTO: 234 K/UL — SIGNIFICANT CHANGE UP (ref 150–400)
POTASSIUM SERPL-MCNC: 5 MMOL/L — SIGNIFICANT CHANGE UP (ref 3.5–5.3)
POTASSIUM SERPL-SCNC: 5 MMOL/L — SIGNIFICANT CHANGE UP (ref 3.5–5.3)
RBC # BLD: 4.62 M/UL — SIGNIFICANT CHANGE UP (ref 4.2–5.8)
RBC # FLD: 16.1 % — HIGH (ref 10.3–14.5)
SODIUM SERPL-SCNC: 140 MMOL/L — SIGNIFICANT CHANGE UP (ref 135–145)
WBC # BLD: 9.79 K/UL — SIGNIFICANT CHANGE UP (ref 3.8–10.5)
WBC # FLD AUTO: 9.79 K/UL — SIGNIFICANT CHANGE UP (ref 3.8–10.5)

## 2022-05-06 PROCEDURE — 22551 ARTHRD ANT NTRBDY CERVICAL: CPT | Mod: AS

## 2022-05-06 PROCEDURE — 22552 ARTHRD ANT NTRBD CERVICAL EA: CPT

## 2022-05-06 PROCEDURE — 22853 INSJ BIOMECHANICAL DEVICE: CPT

## 2022-05-06 PROCEDURE — 22853 INSJ BIOMECHANICAL DEVICE: CPT | Mod: AS

## 2022-05-06 PROCEDURE — 22846 INSERT SPINE FIXATION DEVICE: CPT | Mod: 59

## 2022-05-06 PROCEDURE — 20930 SP BONE ALGRFT MORSEL ADD-ON: CPT

## 2022-05-06 PROCEDURE — 20936 SP BONE AGRFT LOCAL ADD-ON: CPT

## 2022-05-06 PROCEDURE — 20930 SP BONE ALGRFT MORSEL ADD-ON: CPT | Mod: AS

## 2022-05-06 PROCEDURE — 22551 ARTHRD ANT NTRBDY CERVICAL: CPT

## 2022-05-06 PROCEDURE — 22552 ARTHRD ANT NTRBD CERVICAL EA: CPT | Mod: AS

## 2022-05-06 PROCEDURE — 22846 INSERT SPINE FIXATION DEVICE: CPT | Mod: AS

## 2022-05-06 PROCEDURE — 20936 SP BONE AGRFT LOCAL ADD-ON: CPT | Mod: AS

## 2022-05-06 DEVICE — IMPLANTABLE DEVICE: Type: IMPLANTABLE DEVICE | Status: FUNCTIONAL

## 2022-05-06 DEVICE — CAGE ROI-C TI COATED LORDOTIC 14X17 H6: Type: IMPLANTABLE DEVICE | Status: FUNCTIONAL

## 2022-05-06 DEVICE — MATRIX OSTEOCEL PRO BONE MED: Type: IMPLANTABLE DEVICE | Status: FUNCTIONAL

## 2022-05-06 DEVICE — KIT SURGIFLO MATRIX W THROMBIN: Type: IMPLANTABLE DEVICE | Status: FUNCTIONAL

## 2022-05-06 DEVICE — PLATE ROI-C ANCHORING: Type: IMPLANTABLE DEVICE | Status: FUNCTIONAL

## 2022-05-06 RX ORDER — OXYCODONE HYDROCHLORIDE 5 MG/1
1 TABLET ORAL
Qty: 42 | Refills: 0
Start: 2022-05-06 | End: 2022-05-12

## 2022-05-06 RX ORDER — SODIUM CHLORIDE 9 MG/ML
1000 INJECTION, SOLUTION INTRAVENOUS
Refills: 0 | Status: DISCONTINUED | OUTPATIENT
Start: 2022-05-06 | End: 2022-05-07

## 2022-05-06 RX ORDER — POLYETHYLENE GLYCOL 3350 17 G/17G
17 POWDER, FOR SOLUTION ORAL AT BEDTIME
Refills: 0 | Status: DISCONTINUED | OUTPATIENT
Start: 2022-05-06 | End: 2022-05-07

## 2022-05-06 RX ORDER — MAGNESIUM HYDROXIDE 400 MG/1
30 TABLET, CHEWABLE ORAL EVERY 12 HOURS
Refills: 0 | Status: DISCONTINUED | OUTPATIENT
Start: 2022-05-06 | End: 2022-05-07

## 2022-05-06 RX ORDER — SENNA PLUS 8.6 MG/1
2 TABLET ORAL AT BEDTIME
Refills: 0 | Status: DISCONTINUED | OUTPATIENT
Start: 2022-05-06 | End: 2022-05-06

## 2022-05-06 RX ORDER — ACETAMINOPHEN 500 MG
1000 TABLET ORAL ONCE
Refills: 0 | Status: COMPLETED | OUTPATIENT
Start: 2022-05-06 | End: 2022-05-07

## 2022-05-06 RX ORDER — AMLODIPINE BESYLATE 2.5 MG/1
5 TABLET ORAL DAILY
Refills: 0 | Status: DISCONTINUED | OUTPATIENT
Start: 2022-05-06 | End: 2022-05-07

## 2022-05-06 RX ORDER — BENZOCAINE AND MENTHOL 5; 1 G/100ML; G/100ML
1 LIQUID ORAL
Refills: 0 | Status: DISCONTINUED | OUTPATIENT
Start: 2022-05-06 | End: 2022-05-07

## 2022-05-06 RX ORDER — LANOLIN ALCOHOL/MO/W.PET/CERES
3 CREAM (GRAM) TOPICAL AT BEDTIME
Refills: 0 | Status: DISCONTINUED | OUTPATIENT
Start: 2022-05-06 | End: 2022-05-07

## 2022-05-06 RX ORDER — LOSARTAN POTASSIUM 100 MG/1
100 TABLET, FILM COATED ORAL DAILY
Refills: 0 | Status: DISCONTINUED | OUTPATIENT
Start: 2022-05-06 | End: 2022-05-07

## 2022-05-06 RX ORDER — CEFAZOLIN SODIUM 1 G
2000 VIAL (EA) INJECTION EVERY 8 HOURS
Refills: 0 | Status: COMPLETED | OUTPATIENT
Start: 2022-05-06 | End: 2022-05-07

## 2022-05-06 RX ORDER — SENNA PLUS 8.6 MG/1
2 TABLET ORAL AT BEDTIME
Refills: 0 | Status: DISCONTINUED | OUTPATIENT
Start: 2022-05-06 | End: 2022-05-07

## 2022-05-06 RX ORDER — HYDROMORPHONE HYDROCHLORIDE 2 MG/ML
0.5 INJECTION INTRAMUSCULAR; INTRAVENOUS; SUBCUTANEOUS
Refills: 0 | Status: DISCONTINUED | OUTPATIENT
Start: 2022-05-06 | End: 2022-05-06

## 2022-05-06 RX ORDER — SODIUM CHLORIDE 9 MG/ML
1000 INJECTION, SOLUTION INTRAVENOUS
Refills: 0 | Status: DISCONTINUED | OUTPATIENT
Start: 2022-05-06 | End: 2022-05-06

## 2022-05-06 RX ORDER — AMLODIPINE BESYLATE 2.5 MG/1
1 TABLET ORAL
Qty: 0 | Refills: 0 | DISCHARGE

## 2022-05-06 RX ORDER — OXYCODONE HYDROCHLORIDE 5 MG/1
10 TABLET ORAL EVERY 4 HOURS
Refills: 0 | Status: DISCONTINUED | OUTPATIENT
Start: 2022-05-06 | End: 2022-05-07

## 2022-05-06 RX ORDER — SODIUM CHLORIDE 9 MG/ML
3 INJECTION INTRAMUSCULAR; INTRAVENOUS; SUBCUTANEOUS EVERY 8 HOURS
Refills: 0 | Status: DISCONTINUED | OUTPATIENT
Start: 2022-05-06 | End: 2022-05-06

## 2022-05-06 RX ORDER — PANTOPRAZOLE SODIUM 20 MG/1
40 TABLET, DELAYED RELEASE ORAL
Refills: 0 | Status: DISCONTINUED | OUTPATIENT
Start: 2022-05-06 | End: 2022-05-07

## 2022-05-06 RX ORDER — OXYCODONE HYDROCHLORIDE 5 MG/1
5 TABLET ORAL EVERY 4 HOURS
Refills: 0 | Status: DISCONTINUED | OUTPATIENT
Start: 2022-05-06 | End: 2022-05-07

## 2022-05-06 RX ORDER — ASCORBIC ACID 60 MG
1 TABLET,CHEWABLE ORAL
Qty: 0 | Refills: 0 | DISCHARGE
Start: 2022-05-06

## 2022-05-06 RX ORDER — HYDROMORPHONE HYDROCHLORIDE 2 MG/ML
0.5 INJECTION INTRAMUSCULAR; INTRAVENOUS; SUBCUTANEOUS ONCE
Refills: 0 | Status: COMPLETED | OUTPATIENT
Start: 2022-05-06 | End: 2022-05-13

## 2022-05-06 RX ORDER — CYCLOBENZAPRINE HYDROCHLORIDE 10 MG/1
10 TABLET, FILM COATED ORAL THREE TIMES A DAY
Refills: 0 | Status: DISCONTINUED | OUTPATIENT
Start: 2022-05-06 | End: 2022-05-07

## 2022-05-06 RX ORDER — ONDANSETRON 8 MG/1
4 TABLET, FILM COATED ORAL EVERY 6 HOURS
Refills: 0 | Status: DISCONTINUED | OUTPATIENT
Start: 2022-05-06 | End: 2022-05-07

## 2022-05-06 RX ORDER — SENNA PLUS 8.6 MG/1
2 TABLET ORAL
Qty: 0 | Refills: 0 | DISCHARGE
Start: 2022-05-06

## 2022-05-06 RX ORDER — ASCORBIC ACID 60 MG
500 TABLET,CHEWABLE ORAL
Refills: 0 | Status: DISCONTINUED | OUTPATIENT
Start: 2022-05-06 | End: 2022-05-07

## 2022-05-06 RX ORDER — HYDROMORPHONE HYDROCHLORIDE 2 MG/ML
0.5 INJECTION INTRAMUSCULAR; INTRAVENOUS; SUBCUTANEOUS ONCE
Refills: 0 | Status: DISCONTINUED | OUTPATIENT
Start: 2022-05-06 | End: 2022-05-06

## 2022-05-06 RX ADMIN — Medication 100 MILLIGRAM(S): at 20:47

## 2022-05-06 RX ADMIN — POLYETHYLENE GLYCOL 3350 17 GRAM(S): 17 POWDER, FOR SOLUTION ORAL at 20:48

## 2022-05-06 RX ADMIN — HYDROMORPHONE HYDROCHLORIDE 0.5 MILLIGRAM(S): 2 INJECTION INTRAMUSCULAR; INTRAVENOUS; SUBCUTANEOUS at 16:49

## 2022-05-06 RX ADMIN — SODIUM CHLORIDE 75 MILLILITER(S): 9 INJECTION, SOLUTION INTRAVENOUS at 16:49

## 2022-05-06 RX ADMIN — HYDROMORPHONE HYDROCHLORIDE 0.5 MILLIGRAM(S): 2 INJECTION INTRAMUSCULAR; INTRAVENOUS; SUBCUTANEOUS at 20:47

## 2022-05-06 RX ADMIN — OXYCODONE HYDROCHLORIDE 10 MILLIGRAM(S): 5 TABLET ORAL at 23:54

## 2022-05-06 RX ADMIN — HYDROMORPHONE HYDROCHLORIDE 0.5 MILLIGRAM(S): 2 INJECTION INTRAMUSCULAR; INTRAVENOUS; SUBCUTANEOUS at 21:00

## 2022-05-06 RX ADMIN — SODIUM CHLORIDE 3 MILLILITER(S): 9 INJECTION INTRAMUSCULAR; INTRAVENOUS; SUBCUTANEOUS at 10:28

## 2022-05-06 RX ADMIN — SODIUM CHLORIDE 100 MILLILITER(S): 9 INJECTION, SOLUTION INTRAVENOUS at 17:49

## 2022-05-06 RX ADMIN — OXYCODONE HYDROCHLORIDE 10 MILLIGRAM(S): 5 TABLET ORAL at 19:30

## 2022-05-06 RX ADMIN — CYCLOBENZAPRINE HYDROCHLORIDE 10 MILLIGRAM(S): 10 TABLET, FILM COATED ORAL at 17:50

## 2022-05-06 RX ADMIN — HYDROMORPHONE HYDROCHLORIDE 0.5 MILLIGRAM(S): 2 INJECTION INTRAMUSCULAR; INTRAVENOUS; SUBCUTANEOUS at 17:07

## 2022-05-06 RX ADMIN — Medication 500 MILLIGRAM(S): at 18:27

## 2022-05-06 RX ADMIN — OXYCODONE HYDROCHLORIDE 10 MILLIGRAM(S): 5 TABLET ORAL at 18:30

## 2022-05-06 NOTE — DISCHARGE NOTE PROVIDER - NSDCFUADDAPPT_GEN_ALL_CORE_FT
Follow up with your surgeon in two weeks. Call for appointment.  If you need more pain medication, call your surgeon's office. For medication refills or authorizations, please call [phone]  We recommend that you call and schedule a follow up appointment within 2-4 weeks with your primary care physician for repeat blood work (CBC and BMP) for post hospital discharge follow-up care.  Call your surgeon if you have increased redness/pain/drainage or fever. Return to ER for shortness of breath/calf tenderness.

## 2022-05-06 NOTE — DISCHARGE NOTE PROVIDER - NSDCFUADDINST_GEN_ALL_CORE_FT
No bending/lifting/twisting/pulling/pushing/carrying or driving. No blood thinners (not limited to but including) aspirin, motrin, alleve, naproxen, etc.  Keep Dressing Clean, Dry and Intact. May shower on POD#5 with Dressing on. Dressing may be removed on POD#7. Please do not scrub, soak, peel or pick at the dressing. No creams, lotions, or oils over dressing. May shower on POD#5 and let water run over dressing, no baths. Pat dry once out of shower.     If dressing is saturated from border to border. Remove dressing and cover with clean dry dressing.

## 2022-05-06 NOTE — DISCHARGE NOTE PROVIDER - HOSPITAL COURSE
66yMale with history of Cervical radiculopathy presenting for ACDF C5-7 by Dr. Pulliam on 5/6/22. Risk and benefits of surgery were explained to the patient. The patient understood and agreed to proceed with surgery. Patient underwent the procedure with no intraoperative complications. Pt was brought in stable condition to the PACU. Once stable in PACU, pt was brought to the floor. During hospital stay pt was followed by Medicine, physical therapy, Home Care during this admission. Pt had an uneventful hospital course. Pt is stable for discharge to home

## 2022-05-06 NOTE — PHYSICAL THERAPY INITIAL EVALUATION ADULT - IMPAIRMENTS FOUND, PT EVAL
aerobic capacity/endurance/decreased midline orientation/ergonomics and body mechanics/gait, locomotion, and balance/joint integrity and mobility/muscle strength/posture

## 2022-05-06 NOTE — PHYSICAL THERAPY INITIAL EVALUATION ADULT - STRENGTHENING, PT EVAL
Patient will improve strength by 1 grade where limited improve overall functional mobility including gait, transfers, bed mobility and decrease risk of falls.

## 2022-05-06 NOTE — PHYSICAL THERAPY INITIAL EVALUATION ADULT - GENERAL OBSERVATIONS, REHAB EVAL
Pt found semi supine in bed in NAD, +hep lock, +SCDs, +cervical dressing C/D/I, agreeable to PT Analilia and RN Hayley aware.

## 2022-05-06 NOTE — PROGRESS NOTE ADULT - SUBJECTIVE AND OBJECTIVE BOX
66yMale s/p ACDF C5-7 POD#0  Pt seen and examined in NAD.   Pain controlled.  Pt denies any new complaints.   Pt denies CP/SOB/N/V/D/numbness/tingling/bowel or bladder dysfunction.     Vital Signs Last 24 Hrs  T(C): 36.4 (06 May 2022 17:32), Max: 36.9 (06 May 2022 10:10)  T(F): 97.5 (06 May 2022 17:32), Max: 98.5 (06 May 2022 10:10)  HR: 65 (06 May 2022 17:32) (65 - 81)  BP: 140/95 (06 May 2022 17:32) (119/70 - 146/77)  BP(mean): --  RR: 16 (06 May 2022 17:32) (10 - 17)  SpO2: 95% (06 May 2022 17:32) (95% - 99%)    PE:   General: A&Oxs, NAD  Neck: Dressing c/d/i   B/L UE: Skin intact. +ROM shoulder/elbow/wrist/fingers. +ok/thumbsup/fingercross signs.  strength: 5/5.  RP2+ NVI.   B/L LE: Skin intact. +ROM hip/knee/ankle/toes. Ankle Dorsi/plantarflexion: 5/5. Calf: soft, compressible and nontender. DP/PT 2+ NVI.                             12.0   9.79  )-----------( 234      ( 06 May 2022 16:44 )             38.1       05-06    140  |  108  |  28<H>  ----------------------------<  131<H>  5.0   |  27  |  1.07    Ca    8.3<L>      06 May 2022 16:44          A/P: 66yMale s/p ACDF C5-7 POD#0  Wound care  Pain controlled  PT: WBAT: Spinal precautions  Isometric exercises  DVT ppx: SCDs  Incentive spirometry counseled   Discharge: planning   All the above discussed and understood by pt

## 2022-05-06 NOTE — DISCHARGE NOTE PROVIDER - CARE PROVIDER_API CALL
Juan Pulliam)  Orthopaedic Surgery  [address]  [place] [address]  Phone: [phone]  Fax: [phone]  Follow Up Time:

## 2022-05-06 NOTE — DISCHARGE NOTE PROVIDER - NSDCFUSCHEDAPPT_GEN_ALL_CORE_FT
[facility]  [facility] [address]  Scheduled Appointment: 05/19/2022

## 2022-05-06 NOTE — PHYSICAL THERAPY INITIAL EVALUATION ADULT - ADDITIONAL COMMENTS
PLOF / social hx confirmed from previous PT Eval. As per patient, he lives in a private home with his wife, son (both able to assist). Pt states he has one threshold step to enter home at side entrance wide enough to fit rolling walker. Pt states he was independent with ADL's and functional mobility prior to admission. Pt states he has a rolling walker and 3:1 commode from previous surgery. Pt states his mothers bathroom has a raised toilet seat with grab bars to use if needed.

## 2022-05-06 NOTE — DISCHARGE NOTE PROVIDER - NSDCMRMEDTOKEN_GEN_ALL_CORE_FT
ascorbic acid 500 mg oral tablet: 1 tab(s) orally 2 times a day  cyclobenzaprine 10 mg oral tablet: 1 tab(s) orally every 8 hours MDD:3  losartan 100 mg oral tablet: 1 tab(s) orally once a day  Multiple Vitamins oral tablet: 1 tab(s) orally once a day  oxyCODONE 10 mg oral tablet: 1 tab(s) orally every 4 hours, As needed,severe pain  1/2 tab for lesser pain MDD:6  senna oral tablet: 2 tab(s) orally once a day (at bedtime)

## 2022-05-06 NOTE — PHYSICAL THERAPY INITIAL EVALUATION ADULT - ACTIVE RANGE OF MOTION EXAMINATION, REHAB EVAL
cervical ROM limited due to pain/bilateral upper extremity Active ROM was WFL (within functional limits)/bilateral  lower extremity Active ROM was WFL (within functional limits)

## 2022-05-07 ENCOUNTER — TRANSCRIPTION ENCOUNTER (OUTPATIENT)
Age: 67
End: 2022-05-07

## 2022-05-07 VITALS — TEMPERATURE: 98 F

## 2022-05-07 LAB
ANION GAP SERPL CALC-SCNC: 8 MMOL/L — SIGNIFICANT CHANGE UP (ref 5–17)
BASOPHILS # BLD AUTO: 0.02 K/UL — SIGNIFICANT CHANGE UP (ref 0–0.2)
BASOPHILS NFR BLD AUTO: 0.1 % — SIGNIFICANT CHANGE UP (ref 0–2)
BUN SERPL-MCNC: 22 MG/DL — SIGNIFICANT CHANGE UP (ref 7–23)
CALCIUM SERPL-MCNC: 8.6 MG/DL — SIGNIFICANT CHANGE UP (ref 8.5–10.1)
CHLORIDE SERPL-SCNC: 106 MMOL/L — SIGNIFICANT CHANGE UP (ref 96–108)
CO2 SERPL-SCNC: 28 MMOL/L — SIGNIFICANT CHANGE UP (ref 22–31)
CREAT SERPL-MCNC: 1.06 MG/DL — SIGNIFICANT CHANGE UP (ref 0.5–1.3)
EGFR: 77 ML/MIN/1.73M2 — SIGNIFICANT CHANGE UP
EOSINOPHIL # BLD AUTO: 0 K/UL — SIGNIFICANT CHANGE UP (ref 0–0.5)
EOSINOPHIL NFR BLD AUTO: 0 % — SIGNIFICANT CHANGE UP (ref 0–6)
GLUCOSE SERPL-MCNC: 130 MG/DL — HIGH (ref 70–99)
HCT VFR BLD CALC: 37.7 % — LOW (ref 39–50)
HGB BLD-MCNC: 11.7 G/DL — LOW (ref 13–17)
IMM GRANULOCYTES NFR BLD AUTO: 0.5 % — SIGNIFICANT CHANGE UP (ref 0–1.5)
LYMPHOCYTES # BLD AUTO: 1.16 K/UL — SIGNIFICANT CHANGE UP (ref 1–3.3)
LYMPHOCYTES # BLD AUTO: 8.3 % — LOW (ref 13–44)
MCHC RBC-ENTMCNC: 25.4 PG — LOW (ref 27–34)
MCHC RBC-ENTMCNC: 31 G/DL — LOW (ref 32–36)
MCV RBC AUTO: 82 FL — SIGNIFICANT CHANGE UP (ref 80–100)
MONOCYTES # BLD AUTO: 1.03 K/UL — HIGH (ref 0–0.9)
MONOCYTES NFR BLD AUTO: 7.3 % — SIGNIFICANT CHANGE UP (ref 2–14)
NEUTROPHILS # BLD AUTO: 11.76 K/UL — HIGH (ref 1.8–7.4)
NEUTROPHILS NFR BLD AUTO: 83.8 % — HIGH (ref 43–77)
NRBC # BLD: 0 /100 WBCS — SIGNIFICANT CHANGE UP (ref 0–0)
PLATELET # BLD AUTO: 274 K/UL — SIGNIFICANT CHANGE UP (ref 150–400)
POTASSIUM SERPL-MCNC: 4.7 MMOL/L — SIGNIFICANT CHANGE UP (ref 3.5–5.3)
POTASSIUM SERPL-SCNC: 4.7 MMOL/L — SIGNIFICANT CHANGE UP (ref 3.5–5.3)
RBC # BLD: 4.6 M/UL — SIGNIFICANT CHANGE UP (ref 4.2–5.8)
RBC # FLD: 15.9 % — HIGH (ref 10.3–14.5)
SODIUM SERPL-SCNC: 142 MMOL/L — SIGNIFICANT CHANGE UP (ref 135–145)
WBC # BLD: 14.04 K/UL — HIGH (ref 3.8–10.5)
WBC # FLD AUTO: 14.04 K/UL — HIGH (ref 3.8–10.5)

## 2022-05-07 RX ADMIN — Medication 500 MILLIGRAM(S): at 05:02

## 2022-05-07 RX ADMIN — Medication 1000 MILLIGRAM(S): at 03:30

## 2022-05-07 RX ADMIN — OXYCODONE HYDROCHLORIDE 10 MILLIGRAM(S): 5 TABLET ORAL at 08:52

## 2022-05-07 RX ADMIN — OXYCODONE HYDROCHLORIDE 10 MILLIGRAM(S): 5 TABLET ORAL at 09:40

## 2022-05-07 RX ADMIN — LOSARTAN POTASSIUM 100 MILLIGRAM(S): 100 TABLET, FILM COATED ORAL at 05:02

## 2022-05-07 RX ADMIN — Medication 400 MILLIGRAM(S): at 02:59

## 2022-05-07 RX ADMIN — OXYCODONE HYDROCHLORIDE 10 MILLIGRAM(S): 5 TABLET ORAL at 04:59

## 2022-05-07 RX ADMIN — PANTOPRAZOLE SODIUM 40 MILLIGRAM(S): 20 TABLET, DELAYED RELEASE ORAL at 05:02

## 2022-05-07 RX ADMIN — OXYCODONE HYDROCHLORIDE 10 MILLIGRAM(S): 5 TABLET ORAL at 05:30

## 2022-05-07 RX ADMIN — AMLODIPINE BESYLATE 5 MILLIGRAM(S): 2.5 TABLET ORAL at 05:02

## 2022-05-07 RX ADMIN — Medication 100 MILLIGRAM(S): at 05:12

## 2022-05-07 RX ADMIN — OXYCODONE HYDROCHLORIDE 10 MILLIGRAM(S): 5 TABLET ORAL at 00:20

## 2022-05-07 NOTE — PROGRESS NOTE ADULT - SUBJECTIVE AND OBJECTIVE BOX
Orthopedics     Pt seen and examined at the bedside. Pain is well controlled at this time, no concerns at this time.     Vital Signs Last 24 Hrs  T(C): 36.9 (05-07-22 @ 05:00), Max: 36.9 (05-06-22 @ 10:10)  T(F): 98.4 (05-07-22 @ 05:00), Max: 98.5 (05-06-22 @ 10:10)  HR: 78 (05-07-22 @ 05:00) (65 - 83)  BP: 130/64 (05-07-22 @ 05:00) (119/70 - 150/71)  BP(mean): --  RR: 16 (05-07-22 @ 05:00) (10 - 18)  SpO2: 93% (05-07-22 @ 05:00) (93% - 99%)                        11.7   14.04 )-----------( 274      ( 07 May 2022 06:46 )             37.7     07 May 2022 06:46    142    |  106    |  22     ----------------------------<  130    4.7     |  28     |  1.06     Ca    8.6        07 May 2022 06:46      Exam:  GEN: NAD, awake and alert.    SPINE:  Skin intact  Grossly moving all extremities  + Median/Radial/Ulnar/Musculocutaneous/Axillary nerves intact  + Hip Flexors/Quads/Hamstrings/TA/EHL/FHL/GS  SILT C5-T1  SILT L2-S1  + Radial Pulse  + DP/PT Pulses  No saddle anesthesia    Motor:                          Deltoid       Biceps      Triceps      Wrist Ext      Finger Flex    Finger Abduction   RIGHT             5/5             5/5             5/5             5/5                 5/5                     5/5  LEFT                5/5             5/5             5/5             5/5                 5/5                     5/5                          Hip Flex       Knee Ext        Knee Flex     Dorsiflex      Hallux Ext        PlantarFlex  RIGHT            5/5                5/5                 5/5               5/5                 5/5                    5/5  LEFT               5/5                5/5                 5/5               5/5                 5/5                    5/5      Sensory:                      C5      C6      C7      C8       T1          RIGHT          2         2        2          1         2          (0=absent, 1=impaired, 2=normal, NT=not testable)  LEFT             2         2        2         1         2          (0=absent, 1=impaired, 2=normal, NT=not testable)                        L2        L3       L4      L5       S1          RIGHT        2          2         2        2        2           (0=absent, 1=impaired, 2=normal, NT=not testable)  LEFT           2          2         2        2        2           (0=absent, 1=impaired, 2=normal, NT=not testable)    Negative Galindo's sign bilaterally  Negative Babinski bilaterally   Negative Myoclonus bilaterally        A/P:  66y M s/p ACDF C5-7 POD #1    -Pain control prn  - DVT ppx  - WBAT/PT/OOB as tolerated   - FU am labs  - Med mgmt, continue home meds.  -Begin D/C planning if stable and  progressing well with PT.

## 2022-05-07 NOTE — DISCHARGE NOTE NURSING/CASE MANAGEMENT/SOCIAL WORK - NSDCFUADDAPPT_GEN_ALL_CORE_FT
Follow up with your surgeon in two weeks. Call for appointment.  If you need more pain medication, call your surgeon's office. For medication refills or authorizations, please call [phone] xt [phone]  We recommend that you call and schedule a follow up appointment within 2-4 weeks with your primary care physician for repeat blood work (CBC and BMP) for post hospital discharge follow-up care.  Call your surgeon if you have increased redness/pain/drainage or fever. Return to ER for shortness of breath/calf tenderness.

## 2022-05-07 NOTE — DISCHARGE NOTE NURSING/CASE MANAGEMENT/SOCIAL WORK - PATIENT PORTAL LINK FT
You can access the FollowMyHealth Patient Portal offered by [facility] by registering at the following website: [email or web address]. By joining [facility]’s FollowMyHealth portal, you will also be able to view your health information using other applications (apps) compatible with our system.

## 2022-05-07 NOTE — DISCHARGE NOTE NURSING/CASE MANAGEMENT/SOCIAL WORK - NSDCPEFALRISK_GEN_ALL_CORE
For information on Fall & Injury Prevention, visit: [email or web address] OR  [email or web address] OR  https://www.cdc.gov/steadi/patient.html

## 2022-05-10 DIAGNOSIS — K21.9 GASTRO-ESOPHAGEAL REFLUX DISEASE WITHOUT ESOPHAGITIS: ICD-10-CM

## 2022-05-10 DIAGNOSIS — M48.02 SPINAL STENOSIS, CERVICAL REGION: ICD-10-CM

## 2022-05-10 DIAGNOSIS — I10 ESSENTIAL (PRIMARY) HYPERTENSION: ICD-10-CM

## 2022-05-10 DIAGNOSIS — E11.9 TYPE 2 DIABETES MELLITUS WITHOUT COMPLICATIONS: ICD-10-CM

## 2022-05-10 DIAGNOSIS — M54.12 RADICULOPATHY, CERVICAL REGION: ICD-10-CM

## 2022-05-19 ENCOUNTER — APPOINTMENT (OUTPATIENT)
Dept: ORTHOPEDIC SURGERY | Facility: CLINIC | Age: 67
End: 2022-05-19
Payer: COMMERCIAL

## 2022-05-19 PROCEDURE — 99024 POSTOP FOLLOW-UP VISIT: CPT

## 2022-05-19 PROCEDURE — 72040 X-RAY EXAM NECK SPINE 2-3 VW: CPT

## 2022-05-19 RX ORDER — OXYCODONE HYDROCHLORIDE 30 MG/1
30 TABLET, FILM COATED, EXTENDED RELEASE ORAL TWICE DAILY
Qty: 42 | Refills: 0 | Status: ACTIVE | COMMUNITY
Start: 2022-05-19 | End: 1900-01-01

## 2022-05-19 NOTE — DATA REVIEWED
[FreeTextEntry1] : c spine xrays : construct stable versus intraop images; 
Show Applicator Variable?: Yes
Duration Of Freeze Thaw-Cycle (Seconds): 10
Post-Care Instructions: I reviewed with the patient in detail post-care instructions. Patient is to wear sunprotection, and avoid picking at any of the treated lesions. Pt may apply Vaseline to crusted or scabbing areas.
Number Of Freeze-Thaw Cycles: 2 freeze-thaw cycles
Detail Level: Detailed
Render Post-Care Instructions In Note?: no
Consent: The patient's consent was obtained including but not limited to risks of crusting, scabbing, blistering, scarring, darker or lighter pigmentary change, recurrence, incomplete removal and infection.
Spray Paint Text: The liquid nitrogen was applied to the skin utilizing a spray paint frosting technique.
Medical Necessity Clause: This procedure was medically necessary because the lesions that were treated were: acutely irritated.
Duration Of Freeze Thaw-Cycle (Seconds): 10-15
Number Of Freeze-Thaw Cycles: 1 freeze-thaw cycle
Medical Necessity Information: It is in your best interest to select a reason for this procedure from the list below. All of these items fulfill various CMS LCD requirements except the new and changing color options.

## 2022-05-19 NOTE — HISTORY OF PRESENT ILLNESS
[Neck] : neck [Gradual] : gradual [7] : 7 [Throbbing] : throbbing [Constant] : constant [Retired] : Work status: retired [de-identified] : , surgery date 05/06/2022 surgery type ACDF C5-C6-C7 [] : no

## 2022-05-26 ENCOUNTER — FORM ENCOUNTER (OUTPATIENT)
Age: 67
End: 2022-05-26

## 2022-05-30 ENCOUNTER — FORM ENCOUNTER (OUTPATIENT)
Age: 67
End: 2022-05-30

## 2022-06-12 ENCOUNTER — FORM ENCOUNTER (OUTPATIENT)
Age: 67
End: 2022-06-12

## 2022-06-16 ENCOUNTER — APPOINTMENT (OUTPATIENT)
Dept: ORTHOPEDIC SURGERY | Facility: CLINIC | Age: 67
End: 2022-06-16
Payer: COMMERCIAL

## 2022-06-16 PROCEDURE — 99024 POSTOP FOLLOW-UP VISIT: CPT

## 2022-06-16 PROCEDURE — 72040 X-RAY EXAM NECK SPINE 2-3 VW: CPT

## 2022-06-16 NOTE — HISTORY OF PRESENT ILLNESS
[Neck] : neck [Gradual] : gradual [5] : 5 [Dull/Aching] : dull/aching [Meds] : meds [de-identified] : surgery date 05/06/2022 surgery type ACDF C5-C6-C7\par \par 6/16/22: Here for follow up. Continues to have numbness to both hands. Has pain with movement of the neck. Using the bone stimulator. States pain down the arms is getting better, continues to take oxycodone.  [] : no [FreeTextEntry5] : surgery date 05/06/2022 surgery type ACDF C5-C6-C7 [de-identified] : movement

## 2022-06-16 NOTE — PHYSICAL EXAM
[de-identified] : Strength:\par Deltoid: 5/5 b/l\par Biceps: 5/5 b/l\par Tricep: 5/5 b/l\par Wrist flexor/extensors: 5/5 b/l\par Interosseous: 5/5 b/l\par Grasp: 5/5 b/l\par \par Sensation: Grossly Intact\par Hoffmans: Negative\par \par Incision: C/D/I, healing appropriately, no drainage, no signs of infection or dehiscence.

## 2022-06-16 NOTE — ASSESSMENT
[FreeTextEntry1] : 65 y/o M s/p C5-6/C6-7 here for 2nd post op follow up. Cervical Xrays completed, hardware and construct intake.\par   provided a script for PT but we are going to wait a see a little longer

## 2022-06-21 NOTE — PHYSICAL THERAPY INITIAL EVALUATION ADULT - ORIENTATION, REHAB EVAL
oriented to person, place and time , normal sensation , short and long term memory intact oriented to person, place, time and situation

## 2022-06-26 ENCOUNTER — FORM ENCOUNTER (OUTPATIENT)
Age: 67
End: 2022-06-26

## 2022-06-28 RX ORDER — HYDROCODONE BITARTRATE AND ACETAMINOPHEN 10; 325 MG/1; MG/1
10-325 TABLET ORAL 4 TIMES DAILY
Qty: 100 | Refills: 0 | Status: ACTIVE | COMMUNITY
Start: 2022-05-27 | End: 1900-01-01

## 2022-07-28 ENCOUNTER — APPOINTMENT (OUTPATIENT)
Dept: ORTHOPEDIC SURGERY | Facility: CLINIC | Age: 67
End: 2022-07-28

## 2022-08-16 ENCOUNTER — APPOINTMENT (OUTPATIENT)
Dept: ORTHOPEDIC SURGERY | Facility: CLINIC | Age: 67
End: 2022-08-16

## 2022-08-16 VITALS — WEIGHT: 230 LBS | HEIGHT: 72 IN | BODY MASS INDEX: 31.15 KG/M2

## 2022-08-16 DIAGNOSIS — Z00.00 ENCOUNTER FOR GENERAL ADULT MEDICAL EXAMINATION W/OUT ABNORMAL FINDINGS: ICD-10-CM

## 2022-08-16 PROCEDURE — 73564 X-RAY EXAM KNEE 4 OR MORE: CPT | Mod: RT

## 2022-08-16 PROCEDURE — 99204 OFFICE O/P NEW MOD 45 MIN: CPT | Mod: 25

## 2022-08-16 PROCEDURE — 20611 DRAIN/INJ JOINT/BURSA W/US: CPT

## 2022-08-16 NOTE — HISTORY OF PRESENT ILLNESS
[de-identified] : The patient is a 66 year old RT hand dominant male who presents today complaining of right knee \par Date of Injury/Onset: 8/2022\par Pain:    At Rest: 5/10 \par With Activity:  10/10 \par Mechanism of injury: Has had a few falls stepping over a small gate around his house\par This is NOT a Work Related Injury being treated under Worker's Compensation.\par This is NOT an athletic injury occurring associated with an interscholastic or organized sports team.\par Quality of symptoms: Sharp\par Improves with: nsaids\par Worse with: Activity \par Prior Treatment/Imaging: None\par Out of work/sport: _, since _\par School/Sport/Position/Occupation: Retired \par Change since last visit: \par Additional Information: \par \par JN is here today as a new patient for RT knee pain. Pt given medrol by PCP. Pt reports pain started falling onto RT knee about a momth ago. Pt denies acute fall/trauma, no weakness, no n/t. Denies clicking, catching, locking.

## 2022-08-16 NOTE — IMAGING
[Right] : right knee [All Views] : anteroposterior, lateral, skyline, and anteroposterior standing [Moderate tricompartmental OA medial narrowing] : Moderate tricompartmental OA medial narrowing [de-identified] : The patient is a well appearing 66 year old male of their stated age.\par Patient ambulates with a ANTALGIC gait.\par Negative straight leg raise bilateral\par \par General: in no acute distress, seated comfortably, moving easily\par Skin: No discoloration, rashes; on palpation skin is dry, \par Neuro: Normal sensation all dermatomes, motor all myotomes\par Vascular: Normal pulses, no edema, normal temperature\par Coordination and balance: Normal\par Psych: normal mood and affect, non pressured speech, alert and oriented x3\par \par Right Knee:                         	\par ROM:  0-145 degrees\par \par Lachman: Negative\par Pivot Shift: Negative\par Anterior Drawer: Negative\par Posterior Drawer / Sag: Negative\par Varus Stress 0 degrees: Stable\par Varus Stress 30 degrees: Stable\par Valgus Stress 0 degrees: Stable\par Valgus Stress 30 degrees: Stable\par Medial Nan: Positive\par Lateral Nan: Negative\par Patella Glide: 2+\par Patella Apprehension: Negative\par Patella Grind: Positive\par \par Palpation:\par Medial Joint Line: TTP\par Lateral Joint Line: TTP\par Medial Collateral Ligament: Nontender\par Lateral Collateral Ligament/PLC: Nontender\par Distal Femur: Nontender\par Proximal Tibia: Nontender\par Tibial Tubercle: Nontender\par Distal Pole Patella: Nontender\par Quadriceps Tendon: Nontender &  Intact\par Patella Tendon: Nontender &  Intact\par Medial Distal Hamstring/PES: Nontender\par Lateral Distal Hamstring: Nontender & Stable\par Iliotibial Band: Nontender\par Medial Patellofemoral Ligament: Nontender\par Adductor: Nontender\par Proximal GSC-Plantaris: Nontender\par Calf: Supple & Nontender\par \par Inspection:\par Deformity: No\par Erythema: No\par Ecchymosis: No\par Abrasions: No\par Effusion: Moderate\par Prepatellar Bursitis: No\par \par Neurologic Exam:\par Sensation L4-S1: Grossly Intact\par \par Motor Exam:\par Quadriceps: 5 out of 5\par Hamstrings: 5 out of 5\par EHL: 5 out of 5\par FHL: 5 out of 5\par TA: 5 out of 5\par GS: 5 out of 5\par \par Circulatory/Pulses:\par Dorsalis Pedis: 2+\par Posterior Tibialis: 2+\par \par Additional Pertinent Findings: None\par \par Left Knee:                           	\par ROM:  0-145 degrees\par \par Lachman: Negative\par Pivot Shift: Negative\par Anterior Drawer: Negative\par Posterior Drawer / Sag: Negative\par Varus Stress 0 degrees: Stable\par Varus Stress 30 degrees: Stable\par Valgus Stress 0 degrees: Stable\par Valgus Stress 30 degrees: Stable\par Medial Nan: Positive\par Lateral Nan: Negative\par Patella Glide: 2+\par Patella Apprehension: Negative\par Patella Grind: Positive\par \par Palpation:\par Medial Joint Line: TTP\par Lateral Joint Line: TTP\par Medial Collateral Ligament: Nontender\par Lateral Collateral Ligament/PLC: Nontender\par Distal Femur: Nontender\par Proximal Tibia: Nontender\par Tibial Tubercle: Nontender\par Distal Pole Patella: Nontender\par Quadriceps Tendon: Nontender &  Intact\par Patella Tendon: Nontender &  Intact\par Medial Distal Hamstring/PES: Nontender\par Lateral Distal Hamstring: Nontender & Stable\par Iliotibial Band: Nontender\par Medial Patellofemoral Ligament: Nontender\par Adductor: Nontender\par Proximal GSC-Plantaris: Nontender\par Calf: Supple & Nontender\par \par Inspection:\par Deformity: No\par Erythema: No\par Ecchymosis: No\par Abrasions: No\par Effusion: Moderate\par Prepatellar Bursitis: No\par \par Neurologic Exam:\par Sensation L4-S1: Grossly Intact\par \par Motor Exam:\par Quadriceps: 5 out of 5\par Hamstrings: 5 out of 5\par EHL: 5 out of 5\par FHL: 5 out of 5\par TA: 5 out of 5\par GS: 5 out of 5\par \par Circulatory/Pulses:\par Dorsalis Pedis: 2+\par Posterior Tibialis: 2+

## 2022-08-16 NOTE — DISCUSSION/SUMMARY
[de-identified] : Patient and I discussed their symptoms, [RT knee pain] . Discussed findings of today's exam and possible causes of patient's pain. Educated patient on their most probable diagnosis of OA of RT knee. Reviewed possible courses of treatment, and we collaboratively decided best course of treatment at this time will include:\par \par 1. CSI RT knee\par 2. Naproxen, advised pt to complete course of medrol first.\par 3. MRI if no improvement\par \par Instructions: Dx / Natural History\par The patient was advised of the diagnosis.  The natural history of the pathology was explained in full to the patient in layman's terms.  Several different treatment options were discussed and explained in full to the patient including the risks and benefits of both surgical and non-surgical treatments.  All questions and concerns were answered. \par \par RICE\par I explained to the patient that rest, ice, compression, and elevation would benefit them.  They may return to activity after follow-up or when they no longer have any pain.\par \par NSAIDs - OTC\par Patient is to begin over the counter oral anti-inflammatory medications on an as needed basis, as long as there are no medical contraindications.  Patient is counseled on possible GI and blood pressure side effects.\par \par Pain Guide Activities\par The patient was advised to let pain guide the gradual advancement of activities.\par \par Icing\par The patient was advised to apply ice (wrapped in a towel or protective covering) to the area daily (20 minutes at a time, 2-4X/day).\par \par Follow up in [6-8] weeks.\par \par All of the patient's questions were answered to His satisfaction. Diagnoses and potential treatments were reviewed. He agreed with the plan and would like to move forward with it. \par \par Documented by Bryson Valentino.

## 2022-08-16 NOTE — PROCEDURE
[FreeTextEntry3] : Patient Identification \par Name/: Verbal with patient and/or family \par  \par Procedure Verification: \par Procedure confirmed with patient or family/designee \par Consent for procedure: Verbal Consent Given \par Relevant documentation completed, reviewed, and signed \par Clinical indications for procedure confirmed \par  \par Time-out with all members of procedure team immediately prior to procedure: \par Correct patient identified. Agreement on procedure. Correct side and site. \par  \par US GUIDED KNEE INJECTION (STEROID) - RIGHT \par After verbal consent and identification of the correct patient and correct site, the superolateral right knee was prepped using alcohol swabs and betadine. This was allowed time to air dry. A mixture of 1cc Celestone 6mg/ml, 2cc Lidocaine 1%, and 2cc Bupivacaine 0.5% was injected into the suprapatellar pouch with US guidance using a sterile 22G needle after ethyl chloride spray for skin anesthesia. The patient tolerated the procedure well. After-care instructions were provided and included instructions to ice the area and to call if redness, pain, or fever develop. Visualization of the needle and placement of the injection was performed without any complications. Ultrasound was used for visualization, precise injection in area of tear, and / or prior failure or difficult injection

## 2022-08-22 ENCOUNTER — APPOINTMENT (OUTPATIENT)
Dept: ORTHOPEDIC SURGERY | Facility: CLINIC | Age: 67
End: 2022-08-22

## 2022-08-22 ENCOUNTER — FORM ENCOUNTER (OUTPATIENT)
Age: 67
End: 2022-08-22

## 2022-08-22 VITALS — HEIGHT: 72 IN | BODY MASS INDEX: 31.15 KG/M2 | WEIGHT: 230 LBS

## 2022-08-22 PROCEDURE — 99214 OFFICE O/P EST MOD 30 MIN: CPT

## 2022-08-22 NOTE — HISTORY OF PRESENT ILLNESS
[de-identified] : The patient is a 66 year old RT hand dominant male who presents today complaining of right knee \par Date of Injury/Onset: 8/2022\par Pain: At Rest: 5/10 \par With Activity: 10/10 \par Mechanism of injury: Has had a few falls stepping over a small gate around his house\par This is NOT a Work Related Injury being treated under Worker's Compensation.\par This is NOT an athletic injury occurring associated with an interscholastic or organized sports team.\par Quality of symptoms: Sharp\par Improves with: nsaids\par Worse with: Activity \par Treatment/Imaging/Studies Since Last Visit: _\par 	Reports Available For Review Today: _\par \par Out of work/sport: _, since _\par School/Sport/Position/Occupation: Retired \par Change since last visit: \par Additional Information: \par \par 08/22/2022 \par \par JN is presenting today for followup. Pain and symptoms are persistent. He has been taking naproxen with no relief and oxycodone. He denies any numbness/tingling/fevers/chills. Pt not on blood thinners.

## 2022-08-22 NOTE — IMAGING
[de-identified] : The patient is a well appearing 66 year old male of their stated age.\par Patient ambulates with a ANTALGIC gait.\par Negative straight leg raise bilateral\par \par General: in no acute distress, seated comfortably, moving easily\par Skin: No discoloration, rashes; on palpation skin is dry, \par Neuro: Normal sensation all dermatomes, motor all myotomes\par Vascular: Normal pulses, no edema, normal temperature\par Coordination and balance: Normal\par Psych: normal mood and affect, non pressured speech, alert and oriented x3\par \par Right Knee:                         	\par ROM:  0-145 degrees\par \par Lachman: Negative\par Pivot Shift: Negative\par Anterior Drawer: Negative\par Posterior Drawer / Sag: Negative\par Varus Stress 0 degrees: Stable\par Varus Stress 30 degrees: Stable\par Valgus Stress 0 degrees: Stable\par Valgus Stress 30 degrees: Stable\par Medial Nan: Positive\par Lateral Nan: Negative\par Patella Glide: 2+\par Patella Apprehension: Negative\par Patella Grind: Positive\par \par Palpation:\par Medial Joint Line: TTP\par Lateral Joint Line: TTP\par Medial Collateral Ligament: Nontender\par Lateral Collateral Ligament/PLC: Nontender\par Distal Femur: Nontender\par Proximal Tibia: Nontender\par Tibial Tubercle: Nontender\par Distal Pole Patella: Nontender\par Quadriceps Tendon: Nontender &  Intact\par Patella Tendon: Nontender &  Intact\par Medial Distal Hamstring/PES: Nontender\par Lateral Distal Hamstring: Nontender & Stable\par Iliotibial Band: Nontender\par Medial Patellofemoral Ligament: Nontender\par Adductor: Nontender\par Proximal GSC-Plantaris: Nontender\par Calf: Supple & Nontender\par \par Inspection:\par Deformity: No\par Erythema: No\par Ecchymosis: No\par Abrasions: No\par Effusion: Moderate\par Prepatellar Bursitis: No\par \par Neurologic Exam:\par Sensation L4-S1: Grossly Intact\par \par Motor Exam:\par Quadriceps: 5 out of 5\par Hamstrings: 5 out of 5\par EHL: 5 out of 5\par FHL: 5 out of 5\par TA: 5 out of 5\par GS: 5 out of 5\par \par Circulatory/Pulses:\par Dorsalis Pedis: 2+\par Posterior Tibialis: 2+\par \par Additional Pertinent Findings: None\par \par Left Knee:                           	\par ROM:  0-145 degrees\par \par Lachman: Negative\par Pivot Shift: Negative\par Anterior Drawer: Negative\par Posterior Drawer / Sag: Negative\par Varus Stress 0 degrees: Stable\par Varus Stress 30 degrees: Stable\par Valgus Stress 0 degrees: Stable\par Valgus Stress 30 degrees: Stable\par Medial Nan: Positive\par Lateral Nan: Negative\par Patella Glide: 2+\par Patella Apprehension: Negative\par Patella Grind: Positive\par \par Palpation:\par Medial Joint Line: TTP\par Lateral Joint Line: TTP\par Medial Collateral Ligament: Nontender\par Lateral Collateral Ligament/PLC: Nontender\par Distal Femur: Nontender\par Proximal Tibia: Nontender\par Tibial Tubercle: Nontender\par Distal Pole Patella: Nontender\par Quadriceps Tendon: Nontender &  Intact\par Patella Tendon: Nontender &  Intact\par Medial Distal Hamstring/PES: Nontender\par Lateral Distal Hamstring: Nontender & Stable\par Iliotibial Band: Nontender\par Medial Patellofemoral Ligament: Nontender\par Adductor: Nontender\par Proximal GSC-Plantaris: Nontender\par Calf: Supple & Nontender\par \par Inspection:\par Deformity: No\par Erythema: No\par Ecchymosis: No\par Abrasions: No\par Effusion: Moderate\par Prepatellar Bursitis: No\par \par Neurologic Exam:\par Sensation L4-S1: Grossly Intact\par \par Motor Exam:\par Quadriceps: 5 out of 5\par Hamstrings: 5 out of 5\par EHL: 5 out of 5\par FHL: 5 out of 5\par TA: 5 out of 5\par GS: 5 out of 5\par \par Circulatory/Pulses:\par Dorsalis Pedis: 2+\par Posterior Tibialis: 2+

## 2022-08-22 NOTE — DISCUSSION/SUMMARY
[de-identified] : Patient and I discussed their symptoms, RT knee pain. Discussed findings of today's exam and possible causes of patient's pain. Educated patient on their most probable diagnosis of OA of RT knee. Reviewed possible courses of treatment, and we collaboratively decided best course of treatment at this time will include:\par \par 1. Start meloxicam \par 2. MRI RT knee \par \par Instructions: Dx / Natural History\par The patient was advised of the diagnosis.  The natural history of the pathology was explained in full to the patient in layman's terms.  Several different treatment options were discussed and explained in full to the patient including the risks and benefits of both surgical and non-surgical treatments.  All questions and concerns were answered. \par \par RICE\par I explained to the patient that rest, ice, compression, and elevation would benefit them.  They may return to activity after follow-up or when they no longer have any pain.\par \par NSAIDs - OTC\par Patient is to begin over the counter oral anti-inflammatory medications on an as needed basis, as long as there are no medical contraindications.  Patient is counseled on possible GI and blood pressure side effects.\par \par Pain Guide Activities\par The patient was advised to let pain guide the gradual advancement of activities.\par \par Icing\par The patient was advised to apply ice (wrapped in a towel or protective covering) to the area daily (20 minutes at a time, 2-4X/day).\par \par Follow up after MRI. \par \par All of the patient's questions were answered to His satisfaction. Diagnoses and potential treatments were reviewed. He agreed with the plan and would like to move forward with it. \par \par Documented by Bryson Valentino.

## 2022-08-23 ENCOUNTER — APPOINTMENT (OUTPATIENT)
Dept: MRI IMAGING | Facility: CLINIC | Age: 67
End: 2022-08-23

## 2022-08-23 PROCEDURE — 73721 MRI JNT OF LWR EXTRE W/O DYE: CPT | Mod: RT

## 2022-08-25 ENCOUNTER — APPOINTMENT (OUTPATIENT)
Dept: ORTHOPEDIC SURGERY | Facility: CLINIC | Age: 67
End: 2022-08-25

## 2022-08-25 VITALS — BODY MASS INDEX: 31.15 KG/M2 | WEIGHT: 230 LBS | HEIGHT: 72 IN

## 2022-08-25 DIAGNOSIS — S83.289A OTHER TEAR OF LATERAL MENISCUS, CURRENT INJURY, UNSPECIFIED KNEE, INITIAL ENCOUNTER: ICD-10-CM

## 2022-08-25 PROCEDURE — 99204 OFFICE O/P NEW MOD 45 MIN: CPT

## 2022-08-25 PROCEDURE — 99214 OFFICE O/P EST MOD 30 MIN: CPT

## 2022-08-25 NOTE — DATA REVIEWED
[FreeTextEntry1] : 1. Complex tear of the body of the lateral meniscus.\par 2. Complex tear of the body and posterior horn of the medial meniscus.\par 3. High-grade cartilage loss of the medial and patellofemoral compartments.\par 4. Small joint effusion. Edema anterior to the patellar tendon. 1 cm ganglion cyst posterior to the PCL.

## 2022-08-25 NOTE — HISTORY OF PRESENT ILLNESS
[de-identified] : The patient is a 66 year old RT hand dominant male who presents today complaining of right knee \par Date of Injury/Onset: 8/2022\par Pain: At Rest: 5/10 \par With Activity: 10/10 \par Mechanism of injury: Has had a few falls stepping over a small gate around his house\par This is NOT a Work Related Injury being treated under Worker's Compensation.\par This is NOT an athletic injury occurring associated with an interscholastic or organized sports team.\par Quality of symptoms: Sharp\par Improves with: nsaids\par Worse with: Activity \par Treatment/Imaging/Studies Since Last Visit:  MRI \par 	Reports Available For Review Today: _\par Out of work/sport: _, since _\par School/Sport/Position/Occupation: Retired \par Additional Information: \par \par 08/25/2022 \par \par JN is presenting today for followup. Pain and symptoms are similar to the previous visit. He denies any numbness/tingling/fevers/chills. He underwent an MRI since last visit, and is here to discuss the imaging results.

## 2022-08-25 NOTE — DISCUSSION/SUMMARY
[de-identified] : Patient and I discussed their symptoms, RT knee pain. Discussed findings of today's exam and possible causes of patient's pain. Educated patient on their most probable diagnosis of OA RT knee, LMT, MMT. Reviewed possible courses of treatment, and we collaboratively decided best course of treatment at this time will include:\par \par 1. Meloxicam \par 2. Will  knee sleeve OTC \par \par Instructions: Dx / Natural History\par The patient was advised of the diagnosis.  The natural history of the pathology was explained in full to the patient in layman's terms.  Several different treatment options were discussed and explained in full to the patient including the risks and benefits of both surgical and non-surgical treatments.  All questions and concerns were answered. \par \par RICE\par I explained to the patient that rest, ice, compression, and elevation would benefit them.  They may return to activity after follow-up or when they no longer have any pain.\par \par NSAIDs - OTC\par Patient is to begin over the counter oral anti-inflammatory medications on an as needed basis, as long as there are no medical contraindications.  Patient is counseled on possible GI and blood pressure side effects.\par \par Pain Guide Activities\par The patient was advised to let pain guide the gradual advancement of activities.\par \par Icing\par The patient was advised to apply ice (wrapped in a towel or protective covering) to the area daily (20 minutes at a time, 2-4X/day).\par \par Follow up as needed. \par \par All of the patient's questions were answered to His satisfaction. Diagnoses and potential treatments were reviewed. He agreed with the plan and would like to move forward with it.

## 2022-08-25 NOTE — IMAGING
[de-identified] : The patient is a well appearing 66 year old male of their stated age.\par Patient ambulates with a ANTALGIC gait.\par Negative straight leg raise bilateral\par \par General: in no acute distress, seated comfortably, moving easily\par Skin: No discoloration, rashes; on palpation skin is dry, \par Neuro: Normal sensation all dermatomes, motor all myotomes\par Vascular: Normal pulses, no edema, normal temperature\par Coordination and balance: Normal\par Psych: normal mood and affect, non pressured speech, alert and oriented x3\par \par Right Knee:                         	\par ROM:  0-145 degrees\par \par Lachman: Negative\par Pivot Shift: Negative\par Anterior Drawer: Negative\par Posterior Drawer / Sag: Negative\par Varus Stress 0 degrees: Stable\par Varus Stress 30 degrees: Stable\par Valgus Stress 0 degrees: Stable\par Valgus Stress 30 degrees: Stable\par Medial Nan: Positive\par Lateral Nan: Negative\par Patella Glide: 2+\par Patella Apprehension: Negative\par Patella Grind: Positive\par \par Palpation:\par Medial Joint Line: TTP\par Lateral Joint Line: TTP\par Medial Collateral Ligament: Nontender\par Lateral Collateral Ligament/PLC: Nontender\par Distal Femur: Nontender\par Proximal Tibia: Nontender\par Tibial Tubercle: Nontender\par Distal Pole Patella: Nontender\par Quadriceps Tendon: Nontender &  Intact\par Patella Tendon: Nontender &  Intact\par Medial Distal Hamstring/PES: Nontender\par Lateral Distal Hamstring: Nontender & Stable\par Iliotibial Band: Nontender\par Medial Patellofemoral Ligament: Nontender\par Adductor: Nontender\par Proximal GSC-Plantaris: Nontender\par Calf: Supple & Nontender\par \par Inspection:\par Deformity: No\par Erythema: No\par Ecchymosis: No\par Abrasions: No\par Effusion: Moderate\par Prepatellar Bursitis: No\par \par Neurologic Exam:\par Sensation L4-S1: Grossly Intact\par \par Motor Exam:\par Quadriceps: 5 out of 5\par Hamstrings: 5 out of 5\par EHL: 5 out of 5\par FHL: 5 out of 5\par TA: 5 out of 5\par GS: 5 out of 5\par \par Circulatory/Pulses:\par Dorsalis Pedis: 2+\par Posterior Tibialis: 2+\par \par Additional Pertinent Findings: None\par \par Left Knee:                           	\par ROM:  0-145 degrees\par \par Lachman: Negative\par Pivot Shift: Negative\par Anterior Drawer: Negative\par Posterior Drawer / Sag: Negative\par Varus Stress 0 degrees: Stable\par Varus Stress 30 degrees: Stable\par Valgus Stress 0 degrees: Stable\par Valgus Stress 30 degrees: Stable\par Medial Nan: Positive\par Lateral Nan: Negative\par Patella Glide: 2+\par Patella Apprehension: Negative\par Patella Grind: Positive\par \par Palpation:\par Medial Joint Line: TTP\par Lateral Joint Line: TTP\par Medial Collateral Ligament: Nontender\par Lateral Collateral Ligament/PLC: Nontender\par Distal Femur: Nontender\par Proximal Tibia: Nontender\par Tibial Tubercle: Nontender\par Distal Pole Patella: Nontender\par Quadriceps Tendon: Nontender &  Intact\par Patella Tendon: Nontender &  Intact\par Medial Distal Hamstring/PES: Nontender\par Lateral Distal Hamstring: Nontender & Stable\par Iliotibial Band: Nontender\par Medial Patellofemoral Ligament: Nontender\par Adductor: Nontender\par Proximal GSC-Plantaris: Nontender\par Calf: Supple & Nontender\par \par Inspection:\par Deformity: No\par Erythema: No\par Ecchymosis: No\par Abrasions: No\par Effusion: Moderate\par Prepatellar Bursitis: No\par \par Neurologic Exam:\par Sensation L4-S1: Grossly Intact\par \par Motor Exam:\par Quadriceps: 5 out of 5\par Hamstrings: 5 out of 5\par EHL: 5 out of 5\par FHL: 5 out of 5\par TA: 5 out of 5\par GS: 5 out of 5\par \par Circulatory/Pulses:\par Dorsalis Pedis: 2+\par Posterior Tibialis: 2+

## 2022-08-29 RX ORDER — SODIUM HYALURONATE INTRA-ARTICULAR SOLN PREF SYR 25 MG/2.5ML 25/2.5 MG/ML
25 SOLUTION PREFILLED SYRINGE INTRAARTICULAR
Qty: 5 | Refills: 0 | Status: ACTIVE | COMMUNITY
Start: 2022-08-29 | End: 1900-01-01

## 2022-08-30 ENCOUNTER — APPOINTMENT (OUTPATIENT)
Dept: ORTHOPEDIC SURGERY | Facility: CLINIC | Age: 67
End: 2022-08-30

## 2022-09-06 ENCOUNTER — APPOINTMENT (OUTPATIENT)
Dept: ORTHOPEDIC SURGERY | Facility: CLINIC | Age: 67
End: 2022-09-06

## 2022-09-12 ENCOUNTER — APPOINTMENT (OUTPATIENT)
Dept: ORTHOPEDIC SURGERY | Facility: CLINIC | Age: 67
End: 2022-09-12
Payer: COMMERCIAL

## 2022-09-12 VITALS — BODY MASS INDEX: 31.15 KG/M2 | WEIGHT: 230 LBS | HEIGHT: 72 IN

## 2022-09-12 DIAGNOSIS — S83.249A OTHER TEAR OF MEDIAL MENISCUS, CURRENT INJURY, UNSPECIFIED KNEE, INITIAL ENCOUNTER: ICD-10-CM

## 2022-09-12 PROCEDURE — 99212 OFFICE O/P EST SF 10 MIN: CPT | Mod: 25

## 2022-09-12 PROCEDURE — 20610 DRAIN/INJ JOINT/BURSA W/O US: CPT

## 2022-09-12 NOTE — HISTORY OF PRESENT ILLNESS
[de-identified] : Patient is presenting today for Supartz Injection #[1]. Pain and symptoms are improving. Patient has been exercising, and taking anti-inflammatories as needed. Patient denies any numbness/tingling/fevers/chills.

## 2022-09-12 NOTE — DISCUSSION/SUMMARY
[de-identified] : Pt tolerated procedure well, f/u in 1 week for Supartz #[2] \par \par All of the patient's questions were answered to His satisfaction. Diagnoses and potential treatments were reviewed. He agreed with the plan and would like to move forward with it. \par \par History, physical exam, imaging, assessment and plan documented by Bryson Valentino. The documentation recorded by the scribe accurately reflects the service I, Nam Swartz MD, personally performed and the decisions made by me.

## 2022-09-12 NOTE — PROCEDURE
[FreeTextEntry3] : The risks, benefits, and alternatives to viscosupplementation injection were reviewed with the patient. Risks outlined include but are not limited to infection, sepsis, bleeding, scarring, temporary increase in pain, syncopal episode, failure to resolve symptoms, symptoms recurrence, allergic reaction, flare reaction, pseudoseptic reaction.  Patient understood the risks and asked to proceed with this treatment course.\par \par Large joint injection was performed of the right knee. The indication for this procedure was pain, inflammation and x-ray evidence of Osteoarthritis on this or prior visit. The site was prepped with alcohol, betadine, ethyl chloride sprayed topically and sterile technique used. An injection of Supartz series #[1] was used. \par Patient tolerated procedure well. Patient was advised to call if redness, pain or fever occur and apply ice for 15 minutes out of every hour for the next 12-24 hours as tolerated. \par \par Patient has tried OTC's including aspirin, Ibuprofen, Aleve, etc or prescription NSAIDS, and/or exercises at home and/or physical therapy without satisfactory response, patient had decreased mobility in the joint and the risks benefits, and alternatives have been discussed, and verbal consent was obtained.

## 2022-09-12 NOTE — IMAGING
[de-identified] : The patient is a well appearing 66 year old male of their stated age.\par Patient ambulates with a ANTALGIC gait.\par Negative straight leg raise bilateral\par \par General: in no acute distress, seated comfortably, moving easily\par Skin: No discoloration, rashes; on palpation skin is dry, \par Neuro: Normal sensation all dermatomes, motor all myotomes\par Vascular: Normal pulses, no edema, normal temperature\par Coordination and balance: Normal\par Psych: normal mood and affect, non pressured speech, alert and oriented x3\par \par Right Knee:                         	\par ROM:  0-145 degrees\par \par Lachman: Negative\par Pivot Shift: Negative\par Anterior Drawer: Negative\par Posterior Drawer / Sag: Negative\par Varus Stress 0 degrees: Stable\par Varus Stress 30 degrees: Stable\par Valgus Stress 0 degrees: Stable\par Valgus Stress 30 degrees: Stable\par Medial Nan: Positive\par Lateral Nan: Negative\par Patella Glide: 2+\par Patella Apprehension: Negative\par Patella Grind: Positive\par \par Palpation:\par Medial Joint Line: TTP\par Lateral Joint Line: TTP\par Medial Collateral Ligament: Nontender\par Lateral Collateral Ligament/PLC: Nontender\par Distal Femur: Nontender\par Proximal Tibia: Nontender\par Tibial Tubercle: Nontender\par Distal Pole Patella: Nontender\par Quadriceps Tendon: Nontender &  Intact\par Patella Tendon: Nontender &  Intact\par Medial Distal Hamstring/PES: Nontender\par Lateral Distal Hamstring: Nontender & Stable\par Iliotibial Band: Nontender\par Medial Patellofemoral Ligament: Nontender\par Adductor: Nontender\par Proximal GSC-Plantaris: Nontender\par Calf: Supple & Nontender\par \par Inspection:\par Deformity: No\par Erythema: No\par Ecchymosis: No\par Abrasions: No\par Effusion: Moderate\par Prepatellar Bursitis: No\par \par Neurologic Exam:\par Sensation L4-S1: Grossly Intact\par \par Motor Exam:\par Quadriceps: 5 out of 5\par Hamstrings: 5 out of 5\par EHL: 5 out of 5\par FHL: 5 out of 5\par TA: 5 out of 5\par GS: 5 out of 5\par \par Circulatory/Pulses:\par Dorsalis Pedis: 2+\par Posterior Tibialis: 2+\par \par Additional Pertinent Findings: None\par \par Left Knee:                           	\par ROM:  0-145 degrees\par \par Lachman: Negative\par Pivot Shift: Negative\par Anterior Drawer: Negative\par Posterior Drawer / Sag: Negative\par Varus Stress 0 degrees: Stable\par Varus Stress 30 degrees: Stable\par Valgus Stress 0 degrees: Stable\par Valgus Stress 30 degrees: Stable\par Medial Nan: Positive\par Lateral Nan: Negative\par Patella Glide: 2+\par Patella Apprehension: Negative\par Patella Grind: Positive\par \par Palpation:\par Medial Joint Line: TTP\par Lateral Joint Line: TTP\par Medial Collateral Ligament: Nontender\par Lateral Collateral Ligament/PLC: Nontender\par Distal Femur: Nontender\par Proximal Tibia: Nontender\par Tibial Tubercle: Nontender\par Distal Pole Patella: Nontender\par Quadriceps Tendon: Nontender &  Intact\par Patella Tendon: Nontender &  Intact\par Medial Distal Hamstring/PES: Nontender\par Lateral Distal Hamstring: Nontender & Stable\par Iliotibial Band: Nontender\par Medial Patellofemoral Ligament: Nontender\par Adductor: Nontender\par Proximal GSC-Plantaris: Nontender\par Calf: Supple & Nontender\par \par Inspection:\par Deformity: No\par Erythema: No\par Ecchymosis: No\par Abrasions: No\par Effusion: Moderate\par Prepatellar Bursitis: No\par \par Neurologic Exam:\par Sensation L4-S1: Grossly Intact\par \par Motor Exam:\par Quadriceps: 5 out of 5\par Hamstrings: 5 out of 5\par EHL: 5 out of 5\par FHL: 5 out of 5\par TA: 5 out of 5\par GS: 5 out of 5\par \par Circulatory/Pulses:\par Dorsalis Pedis: 2+\par Posterior Tibialis: 2+

## 2022-09-14 ENCOUNTER — RX RENEWAL (OUTPATIENT)
Age: 67
End: 2022-09-14

## 2022-09-19 ENCOUNTER — APPOINTMENT (OUTPATIENT)
Dept: ORTHOPEDIC SURGERY | Facility: CLINIC | Age: 67
End: 2022-09-19
Payer: COMMERCIAL

## 2022-09-19 ENCOUNTER — APPOINTMENT (OUTPATIENT)
Dept: ORTHOPEDIC SURGERY | Facility: CLINIC | Age: 67
End: 2022-09-19

## 2022-09-19 VITALS — WEIGHT: 230 LBS | HEIGHT: 72 IN | BODY MASS INDEX: 31.15 KG/M2

## 2022-09-19 PROCEDURE — 99213 OFFICE O/P EST LOW 20 MIN: CPT | Mod: 25

## 2022-09-19 PROCEDURE — 20611 DRAIN/INJ JOINT/BURSA W/US: CPT | Mod: RT

## 2022-09-22 ENCOUNTER — RX RENEWAL (OUTPATIENT)
Age: 67
End: 2022-09-22

## 2022-09-27 ENCOUNTER — APPOINTMENT (OUTPATIENT)
Dept: ORTHOPEDIC SURGERY | Facility: CLINIC | Age: 67
End: 2022-09-27

## 2022-09-27 VITALS — BODY MASS INDEX: 31.15 KG/M2 | HEIGHT: 72 IN | WEIGHT: 230 LBS

## 2022-09-27 DIAGNOSIS — M17.11 UNILATERAL PRIMARY OSTEOARTHRITIS, RIGHT KNEE: ICD-10-CM

## 2022-09-27 PROCEDURE — 20610 DRAIN/INJ JOINT/BURSA W/O US: CPT | Mod: RT

## 2022-09-27 PROCEDURE — 99212 OFFICE O/P EST SF 10 MIN: CPT | Mod: 25

## 2022-09-28 ENCOUNTER — RX RENEWAL (OUTPATIENT)
Age: 67
End: 2022-09-28

## 2022-09-28 PROBLEM — M17.11 PRIMARY OSTEOARTHRITIS OF RIGHT KNEE: Status: ACTIVE | Noted: 2022-08-16

## 2022-09-28 RX ORDER — NAPROXEN 500 MG/1
500 TABLET ORAL
Qty: 60 | Refills: 0 | Status: ACTIVE | COMMUNITY
Start: 2022-08-16 | End: 1900-01-01

## 2022-09-28 NOTE — DISCUSSION/SUMMARY
[de-identified] : Pt tolerated procedure well, f/u in 1 week for Supartz #[3] \par \par All of the patient's questions were answered to His satisfaction. Diagnoses and potential treatments were reviewed. He agreed with the plan and would like to move forward with it.

## 2022-09-28 NOTE — PROCEDURE
[FreeTextEntry3] : The risks, benefits, and alternatives to viscosupplementation injection were reviewed with the patient. Risks outlined include but are not limited to infection, sepsis, bleeding, scarring, temporary increase in pain, syncopal episode, failure to resolve symptoms, symptoms recurrence, allergic reaction, flare reaction, pseudoseptic reaction.  Patient understood the risks and asked to proceed with this treatment course.\par \par Large joint injection was performed of the right knee. The indication for this procedure was pain, inflammation and x-ray evidence of Osteoarthritis on this or prior visit. The site was prepped with alcohol, betadine, ethyl chloride sprayed topically and sterile technique used. An injection of Supartz series #[3] was used. \par Patient tolerated procedure well. Patient was advised to call if redness, pain or fever occur and apply ice for 15 minutes out of every hour for the next 12-24 hours as tolerated. \par \par Patient has tried OTC's including aspirin, Ibuprofen, Aleve, etc or prescription NSAIDS, and/or exercises at home and/or physical therapy without satisfactory response, patient had decreased mobility in the joint and the risks benefits, and alternatives have been discussed, and verbal consent was obtained.

## 2022-09-28 NOTE — PROCEDURE
[FreeTextEntry3] : The risks, benefits, and alternatives to viscosupplementation injection were reviewed with the patient. Risks outlined include but are not limited to infection, sepsis, bleeding, scarring, temporary increase in pain, syncopal episode, failure to resolve symptoms, symptoms recurrence, allergic reaction, flare reaction, pseudoseptic reaction.  Patient understood the risks and asked to proceed with this treatment course.\par \par Large joint injection was performed of the right knee. The indication for this procedure was pain, inflammation and x-ray evidence of Osteoarthritis on this or prior visit. The site was prepped with alcohol, betadine, ethyl chloride sprayed topically and sterile technique used. An injection of Supartz series #[2] was used. \par Patient tolerated procedure well. Patient was advised to call if redness, pain or fever occur and apply ice for 15 minutes out of every hour for the next 12-24 hours as tolerated. \par \par Patient has tried OTC's including aspirin, Ibuprofen, Aleve, etc or prescription NSAIDS, and/or exercises at home and/or physical therapy without satisfactory response, patient had decreased mobility in the joint and the risks benefits, and alternatives have been discussed, and verbal consent was obtained.

## 2022-09-28 NOTE — PHYSICAL EXAM
[de-identified] : The patient is a well appearing 66 year old male of their stated age.\par Patient ambulates with a ANTALGIC gait.\par Negative straight leg raise bilateral\par \par General: in no acute distress, seated comfortably, moving easily\par Skin: No discoloration, rashes; on palpation skin is dry, \par Neuro: Normal sensation all dermatomes, motor all myotomes\par Vascular: Normal pulses, no edema, normal temperature\par Coordination and balance: Normal\par Psych: normal mood and affect, non pressured speech, alert and oriented x3\par \par Right Knee:                         	\par ROM:  0-145 degrees\par \par Lachman: Negative\par Pivot Shift: Negative\par Anterior Drawer: Negative\par Posterior Drawer / Sag: Negative\par Varus Stress 0 degrees: Stable\par Varus Stress 30 degrees: Stable\par Valgus Stress 0 degrees: Stable\par Valgus Stress 30 degrees: Stable\par Medial Nan: Positive\par Lateral Nan: Negative\par Patella Glide: 2+\par Patella Apprehension: Negative\par Patella Grind: Positive\par \par Palpation:\par Medial Joint Line: TTP\par Lateral Joint Line: TTP\par Medial Collateral Ligament: Nontender\par Lateral Collateral Ligament/PLC: Nontender\par Distal Femur: Nontender\par Proximal Tibia: Nontender\par Tibial Tubercle: Nontender\par Distal Pole Patella: Nontender\par Quadriceps Tendon: Nontender &  Intact\par Patella Tendon: Nontender &  Intact\par Medial Distal Hamstring/PES: Nontender\par Lateral Distal Hamstring: Nontender & Stable\par Iliotibial Band: Nontender\par Medial Patellofemoral Ligament: Nontender\par Adductor: Nontender\par Proximal GSC-Plantaris: Nontender\par Calf: Supple & Nontender\par \par Inspection:\par Deformity: No\par Erythema: No\par Ecchymosis: No\par Abrasions: No\par Effusion: Moderate\par Prepatellar Bursitis: No\par \par Neurologic Exam:\par Sensation L4-S1: Grossly Intact\par \par Motor Exam:\par Quadriceps: 5 out of 5\par Hamstrings: 5 out of 5\par EHL: 5 out of 5\par FHL: 5 out of 5\par TA: 5 out of 5\par GS: 5 out of 5\par \par Circulatory/Pulses:\par Dorsalis Pedis: 2+\par Posterior Tibialis: 2+\par \par Additional Pertinent Findings: None\par \par Left Knee:                           	\par ROM:  0-145 degrees\par \par Lachman: Negative\par Pivot Shift: Negative\par Anterior Drawer: Negative\par Posterior Drawer / Sag: Negative\par Varus Stress 0 degrees: Stable\par Varus Stress 30 degrees: Stable\par Valgus Stress 0 degrees: Stable\par Valgus Stress 30 degrees: Stable\par Medial Nan: Positive\par Lateral Nan: Negative\par Patella Glide: 2+\par Patella Apprehension: Negative\par Patella Grind: Positive\par \par Palpation:\par Medial Joint Line: TTP\par Lateral Joint Line: TTP\par Medial Collateral Ligament: Nontender\par Lateral Collateral Ligament/PLC: Nontender\par Distal Femur: Nontender\par Proximal Tibia: Nontender\par Tibial Tubercle: Nontender\par Distal Pole Patella: Nontender\par Quadriceps Tendon: Nontender &  Intact\par Patella Tendon: Nontender &  Intact\par Medial Distal Hamstring/PES: Nontender\par Lateral Distal Hamstring: Nontender & Stable\par Iliotibial Band: Nontender\par Medial Patellofemoral Ligament: Nontender\par Adductor: Nontender\par Proximal GSC-Plantaris: Nontender\par Calf: Supple & Nontender\par \par Inspection:\par Deformity: No\par Erythema: No\par Ecchymosis: No\par Abrasions: No\par Effusion: Moderate\par Prepatellar Bursitis: No\par \par Neurologic Exam:\par Sensation L4-S1: Grossly Intact\par \par Motor Exam:\par Quadriceps: 5 out of 5\par Hamstrings: 5 out of 5\par EHL: 5 out of 5\par FHL: 5 out of 5\par TA: 5 out of 5\par GS: 5 out of 5\par \par Circulatory/Pulses:\par Dorsalis Pedis: 2+\par Posterior Tibialis: 2+

## 2022-09-28 NOTE — HISTORY OF PRESENT ILLNESS
[de-identified] : Patient is presenting today for Supartz Injection #3. Pain and symptoms are the same. Patient has been exercising, and taking anti-inflammatories as needed. Patient denies any numbness/tingling/fevers/chills.

## 2022-09-28 NOTE — HISTORY OF PRESENT ILLNESS
[de-identified] : Patient is presenting today for Viscosupplementation Injection #2. Pain and symptoms are the same. Patient has been exercising, and taking anti-inflammatories as needed. Patient denies any numbness/tingling/fevers/chills.

## 2022-09-28 NOTE — PHYSICAL EXAM
[de-identified] : The patient is a well appearing 66 year old male of their stated age.\par Patient ambulates with a ANTALGIC gait.\par Negative straight leg raise bilateral\par \par General: in no acute distress, seated comfortably, moving easily\par Skin: No discoloration, rashes; on palpation skin is dry, \par Neuro: Normal sensation all dermatomes, motor all myotomes\par Vascular: Normal pulses, no edema, normal temperature\par Coordination and balance: Normal\par Psych: normal mood and affect, non pressured speech, alert and oriented x3\par \par Right Knee:                         	\par ROM:  0-145 degrees\par \par Lachman: Negative\par Pivot Shift: Negative\par Anterior Drawer: Negative\par Posterior Drawer / Sag: Negative\par Varus Stress 0 degrees: Stable\par Varus Stress 30 degrees: Stable\par Valgus Stress 0 degrees: Stable\par Valgus Stress 30 degrees: Stable\par Medial Nan: Positive\par Lateral Nan: Negative\par Patella Glide: 2+\par Patella Apprehension: Negative\par Patella Grind: Positive\par \par Palpation:\par Medial Joint Line: TTP\par Lateral Joint Line: TTP\par Medial Collateral Ligament: Nontender\par Lateral Collateral Ligament/PLC: Nontender\par Distal Femur: Nontender\par Proximal Tibia: Nontender\par Tibial Tubercle: Nontender\par Distal Pole Patella: Nontender\par Quadriceps Tendon: Nontender &  Intact\par Patella Tendon: Nontender &  Intact\par Medial Distal Hamstring/PES: Nontender\par Lateral Distal Hamstring: Nontender & Stable\par Iliotibial Band: Nontender\par Medial Patellofemoral Ligament: Nontender\par Adductor: Nontender\par Proximal GSC-Plantaris: Nontender\par Calf: Supple & Nontender\par \par Inspection:\par Deformity: No\par Erythema: No\par Ecchymosis: No\par Abrasions: No\par Effusion: Moderate\par Prepatellar Bursitis: No\par \par Neurologic Exam:\par Sensation L4-S1: Grossly Intact\par \par Motor Exam:\par Quadriceps: 5 out of 5\par Hamstrings: 5 out of 5\par EHL: 5 out of 5\par FHL: 5 out of 5\par TA: 5 out of 5\par GS: 5 out of 5\par \par Circulatory/Pulses:\par Dorsalis Pedis: 2+\par Posterior Tibialis: 2+\par \par Additional Pertinent Findings: None\par \par Left Knee:                           	\par ROM:  0-145 degrees\par \par Lachman: Negative\par Pivot Shift: Negative\par Anterior Drawer: Negative\par Posterior Drawer / Sag: Negative\par Varus Stress 0 degrees: Stable\par Varus Stress 30 degrees: Stable\par Valgus Stress 0 degrees: Stable\par Valgus Stress 30 degrees: Stable\par Medial Nan: Positive\par Lateral Nan: Negative\par Patella Glide: 2+\par Patella Apprehension: Negative\par Patella Grind: Positive\par \par Palpation:\par Medial Joint Line: TTP\par Lateral Joint Line: TTP\par Medial Collateral Ligament: Nontender\par Lateral Collateral Ligament/PLC: Nontender\par Distal Femur: Nontender\par Proximal Tibia: Nontender\par Tibial Tubercle: Nontender\par Distal Pole Patella: Nontender\par Quadriceps Tendon: Nontender &  Intact\par Patella Tendon: Nontender &  Intact\par Medial Distal Hamstring/PES: Nontender\par Lateral Distal Hamstring: Nontender & Stable\par Iliotibial Band: Nontender\par Medial Patellofemoral Ligament: Nontender\par Adductor: Nontender\par Proximal GSC-Plantaris: Nontender\par Calf: Supple & Nontender\par \par Inspection:\par Deformity: No\par Erythema: No\par Ecchymosis: No\par Abrasions: No\par Effusion: Moderate\par Prepatellar Bursitis: No\par \par Neurologic Exam:\par Sensation L4-S1: Grossly Intact\par \par Motor Exam:\par Quadriceps: 5 out of 5\par Hamstrings: 5 out of 5\par EHL: 5 out of 5\par FHL: 5 out of 5\par TA: 5 out of 5\par GS: 5 out of 5\par \par Circulatory/Pulses:\par Dorsalis Pedis: 2+\par Posterior Tibialis: 2+

## 2022-10-03 NOTE — DATA REVIEWED
[MRI] : MRI [Outside X-rays] : outside x-rays [Right] : of the right [Knee] : knee [I independently reviewed and interpreted images and report] : I independently reviewed and interpreted images and report

## 2022-10-04 ENCOUNTER — APPOINTMENT (OUTPATIENT)
Dept: ORTHOPEDIC SURGERY | Facility: CLINIC | Age: 67
End: 2022-10-04
Payer: COMMERCIAL

## 2022-10-04 PROCEDURE — 99204 OFFICE O/P NEW MOD 45 MIN: CPT | Mod: 25

## 2022-10-04 PROCEDURE — 20610 DRAIN/INJ JOINT/BURSA W/O US: CPT | Mod: RT

## 2022-10-04 NOTE — IMAGING

## 2022-10-04 NOTE — ASSESSMENT
[FreeTextEntry1] : notes from dr. farley reviewed\par Imaging was reviewed and independently interpreted\par xrays - deg changes\par mri  right knee 8/23/22 - mmt, lmt, tricomp deg, cyst post to pcl\par \par \par \par - We discussed their diagnosis and treatment options at length including the risks and benefits of both surgical and non-surgical options.\par - We will continue conservative treatment with activity modification, PT, icing, weight loss, and anti-inflammatory medications.\par - The patient was provided with a PT prescription to work on ROM, hip ER/abductors strengthening, quad/hamstring stretches and strengthening, and other exercises \par - The patient was advised to let pain guide the gradual advancement of activities. \par - we spoke about possibility of viscosupplementation injections and they want to proceed\par - right knee supartz given, mari well\par \par

## 2022-10-04 NOTE — HISTORY OF PRESENT ILLNESS
[de-identified] : 66 year old male  chronic right knee kristi worseing since 2022.  pain ant and medial and worse with activity.  has done ice, ant-infla meds, activity mod, injections. seen Dr. Swartz.

## 2022-10-04 NOTE — PROCEDURE
[FreeTextEntry3] : \par Injection Procedure Note:\par \par The risks, benefits, and alternatives to viscosupplementation injection were reviewed with the patient. Risks outlined include but are not limited to infection, sepsis, bleeding, scarring, temporary increase in pain, syncopal episode, failure to resolve symptoms, symptoms recurrence, allergic reaction, flare reaction, pseudoseptic reaction.  Patient understood the risks and asked to proceed with this treatment course.\par \par Patient Identification\par Name/: Verbal with patient and/or family\par \par Procedure Verification:\par Procedure confirmed with patient or family/designee\par Consent for procedure: Verbal Consent Given\par Relevant documentation completed, reviewed, and signed\par Clinical indications for procedure confirmed\par \par Time-out with all members of procedure team immediately prior to procedure:\par Correct patient identified. Agreement on procedure. Correct side and site.\par \par \par KNEE INJECTION (Visco) - RIGHT\par After verbal consent and identification of the correct patient and correct site, the right knee were prepped using alcohol swabs and betadine. This was allowed time to air dry. \par An injection of SUPARTZ 2.5ml\par was injected into the knee using a sterile 22G needle after ethyl chloride spray for skin anesthesia.  The patient tolerated the procedure well. After-care instructions were provided and included instructions to ice the area and to call if redness, pain, or fever develop.\par

## 2022-10-11 ENCOUNTER — APPOINTMENT (OUTPATIENT)
Dept: ORTHOPEDIC SURGERY | Facility: CLINIC | Age: 67
End: 2022-10-11
Payer: COMMERCIAL

## 2022-10-11 VITALS — HEIGHT: 72 IN | BODY MASS INDEX: 31.15 KG/M2 | WEIGHT: 230 LBS

## 2022-10-11 PROCEDURE — 99213 OFFICE O/P EST LOW 20 MIN: CPT | Mod: 25

## 2022-10-11 PROCEDURE — 20610 DRAIN/INJ JOINT/BURSA W/O US: CPT | Mod: RT

## 2022-10-17 ENCOUNTER — APPOINTMENT (OUTPATIENT)
Dept: ORTHOPEDIC SURGERY | Facility: CLINIC | Age: 67
End: 2022-10-17

## 2022-10-22 NOTE — DISCUSSION/SUMMARY
[de-identified] : Pt tolerated procedure well, f/u in 3 months or sooner if needed.\par \par All of the patient's questions were answered to His satisfaction. Diagnoses and potential treatments were reviewed. He agreed with the plan and would like to move forward with it.

## 2022-10-22 NOTE — PROCEDURE
[FreeTextEntry3] : The risks, benefits, and alternatives to viscosupplementation injection were reviewed with the patient. Risks outlined include but are not limited to infection, sepsis, bleeding, scarring, temporary increase in pain, syncopal episode, failure to resolve symptoms, symptoms recurrence, allergic reaction, flare reaction, pseudoseptic reaction.  Patient understood the risks and asked to proceed with this treatment course.\par \par Large joint injection was performed of the right knee. The indication for this procedure was pain, inflammation and x-ray evidence of Osteoarthritis on this or prior visit. The site was prepped with alcohol, betadine, ethyl chloride sprayed topically and sterile technique used. An injection of Supartz series #5 was used. \par Patient tolerated procedure well. Patient was advised to call if redness, pain or fever occur and apply ice for 15 minutes out of every hour for the next 12-24 hours as tolerated. \par \par Patient has tried OTC's including aspirin, Ibuprofen, Aleve, etc or prescription NSAIDS, and/or exercises at home and/or physical therapy without satisfactory response, patient had decreased mobility in the joint and the risks benefits, and alternatives have been discussed, and verbal consent was obtained. \par \par Ultrasound guidance was indicated for this patient due to precise injection in area of tear. All ultrasound images have been permanently captured and stored accordingly in our picture archiving and communication system. Visualization of the needle and placement of injection was performed without complication.

## 2022-10-22 NOTE — HISTORY OF PRESENT ILLNESS
[de-identified] : Patient is presenting today for Supartz Injection #5. Pain and symptoms are improving. Patient has been exercising, and taking anti-inflammatories as needed. Patient denies any numbness/tingling/fevers/chills.

## 2022-10-22 NOTE — PHYSICAL EXAM
[de-identified] : The patient is a well appearing 66 year old male of their stated age.\par Patient ambulates with a ANTALGIC gait.\par Negative straight leg raise bilateral\par \par General: in no acute distress, seated comfortably, moving easily\par Skin: No discoloration, rashes; on palpation skin is dry, \par Neuro: Normal sensation all dermatomes, motor all myotomes\par Vascular: Normal pulses, no edema, normal temperature\par Coordination and balance: Normal\par Psych: normal mood and affect, non pressured speech, alert and oriented x3\par \par Right Knee:                         	\par ROM:  0-145 degrees\par \par Lachman: Negative\par Pivot Shift: Negative\par Anterior Drawer: Negative\par Posterior Drawer / Sag: Negative\par Varus Stress 0 degrees: Stable\par Varus Stress 30 degrees: Stable\par Valgus Stress 0 degrees: Stable\par Valgus Stress 30 degrees: Stable\par Medial Nan: Positive\par Lateral Nan: Negative\par Patella Glide: 2+\par Patella Apprehension: Negative\par Patella Grind: Positive\par \par Palpation:\par Medial Joint Line: TTP\par Lateral Joint Line: TTP\par Medial Collateral Ligament: Nontender\par Lateral Collateral Ligament/PLC: Nontender\par Distal Femur: Nontender\par Proximal Tibia: Nontender\par Tibial Tubercle: Nontender\par Distal Pole Patella: Nontender\par Quadriceps Tendon: Nontender &  Intact\par Patella Tendon: Nontender &  Intact\par Medial Distal Hamstring/PES: Nontender\par Lateral Distal Hamstring: Nontender & Stable\par Iliotibial Band: Nontender\par Medial Patellofemoral Ligament: Nontender\par Adductor: Nontender\par Proximal GSC-Plantaris: Nontender\par Calf: Supple & Nontender\par \par Inspection:\par Deformity: No\par Erythema: No\par Ecchymosis: No\par Abrasions: No\par Effusion: Moderate\par Prepatellar Bursitis: No\par \par Neurologic Exam:\par Sensation L4-S1: Grossly Intact\par \par Motor Exam:\par Quadriceps: 5 out of 5\par Hamstrings: 5 out of 5\par EHL: 5 out of 5\par FHL: 5 out of 5\par TA: 5 out of 5\par GS: 5 out of 5\par \par Circulatory/Pulses:\par Dorsalis Pedis: 2+\par Posterior Tibialis: 2+\par \par Additional Pertinent Findings: None\par \par Left Knee:                           	\par ROM:  0-145 degrees\par \par Lachman: Negative\par Pivot Shift: Negative\par Anterior Drawer: Negative\par Posterior Drawer / Sag: Negative\par Varus Stress 0 degrees: Stable\par Varus Stress 30 degrees: Stable\par Valgus Stress 0 degrees: Stable\par Valgus Stress 30 degrees: Stable\par Medial Nan: Positive\par Lateral Nan: Negative\par Patella Glide: 2+\par Patella Apprehension: Negative\par Patella Grind: Positive\par \par Palpation:\par Medial Joint Line: TTP\par Lateral Joint Line: TTP\par Medial Collateral Ligament: Nontender\par Lateral Collateral Ligament/PLC: Nontender\par Distal Femur: Nontender\par Proximal Tibia: Nontender\par Tibial Tubercle: Nontender\par Distal Pole Patella: Nontender\par Quadriceps Tendon: Nontender &  Intact\par Patella Tendon: Nontender &  Intact\par Medial Distal Hamstring/PES: Nontender\par Lateral Distal Hamstring: Nontender & Stable\par Iliotibial Band: Nontender\par Medial Patellofemoral Ligament: Nontender\par Adductor: Nontender\par Proximal GSC-Plantaris: Nontender\par Calf: Supple & Nontender\par \par Inspection:\par Deformity: No\par Erythema: No\par Ecchymosis: No\par Abrasions: No\par Effusion: Moderate\par Prepatellar Bursitis: No\par \par Neurologic Exam:\par Sensation L4-S1: Grossly Intact\par \par Motor Exam:\par Quadriceps: 5 out of 5\par Hamstrings: 5 out of 5\par EHL: 5 out of 5\par FHL: 5 out of 5\par TA: 5 out of 5\par GS: 5 out of 5\par \par Circulatory/Pulses:\par Dorsalis Pedis: 2+\par Posterior Tibialis: 2+

## 2022-10-29 ENCOUNTER — RX RENEWAL (OUTPATIENT)
Age: 67
End: 2022-10-29

## 2022-12-20 ENCOUNTER — RX RENEWAL (OUTPATIENT)
Age: 67
End: 2022-12-20

## 2023-01-17 ENCOUNTER — APPOINTMENT (OUTPATIENT)
Dept: ORTHOPEDIC SURGERY | Facility: CLINIC | Age: 68
End: 2023-01-17

## 2023-01-17 ENCOUNTER — RX RENEWAL (OUTPATIENT)
Age: 68
End: 2023-01-17

## 2023-01-25 ENCOUNTER — APPOINTMENT (OUTPATIENT)
Dept: ORTHOPEDIC SURGERY | Facility: CLINIC | Age: 68
End: 2023-01-25

## 2023-02-03 ENCOUNTER — APPOINTMENT (OUTPATIENT)
Dept: ORTHOPEDIC SURGERY | Facility: CLINIC | Age: 68
End: 2023-02-03
Payer: COMMERCIAL

## 2023-02-03 VITALS — HEIGHT: 72 IN | BODY MASS INDEX: 31.15 KG/M2 | WEIGHT: 230 LBS

## 2023-02-03 DIAGNOSIS — Z98.1 ARTHRODESIS STATUS: ICD-10-CM

## 2023-02-03 PROCEDURE — 99214 OFFICE O/P EST MOD 30 MIN: CPT

## 2023-02-03 PROCEDURE — 72110 X-RAY EXAM L-2 SPINE 4/>VWS: CPT

## 2023-02-03 PROCEDURE — 72050 X-RAY EXAM NECK SPINE 4/5VWS: CPT

## 2023-02-06 ENCOUNTER — APPOINTMENT (OUTPATIENT)
Dept: NEUROLOGY | Facility: CLINIC | Age: 68
End: 2023-02-06

## 2023-02-08 ENCOUNTER — FORM ENCOUNTER (OUTPATIENT)
Age: 68
End: 2023-02-08

## 2023-02-09 ENCOUNTER — APPOINTMENT (OUTPATIENT)
Dept: MRI IMAGING | Facility: CLINIC | Age: 68
End: 2023-02-09
Payer: COMMERCIAL

## 2023-02-09 PROCEDURE — 72148 MRI LUMBAR SPINE W/O DYE: CPT

## 2023-02-14 ENCOUNTER — RX RENEWAL (OUTPATIENT)
Age: 68
End: 2023-02-14

## 2023-02-21 ENCOUNTER — APPOINTMENT (OUTPATIENT)
Dept: NEUROLOGY | Facility: CLINIC | Age: 68
End: 2023-02-21
Payer: COMMERCIAL

## 2023-02-21 DIAGNOSIS — R20.0 ANESTHESIA OF SKIN: ICD-10-CM

## 2023-02-21 DIAGNOSIS — R20.2 ANESTHESIA OF SKIN: ICD-10-CM

## 2023-02-21 PROCEDURE — 95913 NRV CNDJ TEST 13/> STUDIES: CPT

## 2023-02-21 PROCEDURE — 95886 MUSC TEST DONE W/N TEST COMP: CPT | Mod: NC

## 2023-02-27 ENCOUNTER — APPOINTMENT (OUTPATIENT)
Dept: ORTHOPEDIC SURGERY | Facility: CLINIC | Age: 68
End: 2023-02-27
Payer: COMMERCIAL

## 2023-02-27 VITALS — BODY MASS INDEX: 31.15 KG/M2 | HEIGHT: 72 IN | WEIGHT: 230 LBS

## 2023-02-27 DIAGNOSIS — M17.11 UNILATERAL PRIMARY OSTEOARTHRITIS, RIGHT KNEE: ICD-10-CM

## 2023-02-27 PROCEDURE — 99214 OFFICE O/P EST MOD 30 MIN: CPT | Mod: 25

## 2023-02-27 PROCEDURE — 20611 DRAIN/INJ JOINT/BURSA W/US: CPT

## 2023-03-03 ENCOUNTER — APPOINTMENT (OUTPATIENT)
Dept: ORTHOPEDIC SURGERY | Facility: CLINIC | Age: 68
End: 2023-03-03
Payer: COMMERCIAL

## 2023-03-03 VITALS — WEIGHT: 230 LBS | HEIGHT: 72 IN | BODY MASS INDEX: 31.15 KG/M2

## 2023-03-03 DIAGNOSIS — G56.03 CARPAL TUNNEL SYNDROM,BILATERAL UPPER LIMBS: ICD-10-CM

## 2023-03-03 DIAGNOSIS — G56.23 LESION OF ULNAR NERVE, BILATERAL UPPER LIMBS: ICD-10-CM

## 2023-03-03 PROCEDURE — 99214 OFFICE O/P EST MOD 30 MIN: CPT

## 2023-03-03 NOTE — HISTORY OF PRESENT ILLNESS
[Lower back] : lower back [Gradual] : gradual [5] : 5 [Dull/Aching] : dull/aching [Frequent] : frequent [Sleep] : sleep [Rest] : rest [Physical therapy] : physical therapy [Sitting] : sitting [Bending forward] : bending forward [] : yes [Retired] : Work status: retired [de-identified] : 2/3/23: Patient is a follow up. Patient has an extensive hx for lumbar and cervical surgeries. Patient went to physical therapy which provided slight relief for his pain. lumbar 5/10, more numbness than pain in left > right and weakness in the left. Patients neck pain has slightly improved.  [FreeTextEntry4] : december 2021 [FreeTextEntry5] : no injury [FreeTextEntry7] : to the bottoms, numbness of left foot [de-identified] : december 2022

## 2023-03-03 NOTE — DATA REVIEWED
[FreeTextEntry1] : On my interpretation of these images from [facility] on 2/3/22 demonstrates L2-3, L3-5 DLIF along with an L4-5 ALIF MIS instrumentation L2-5 \par \par On my interpretation of these images from [facility] on 2/3/22 demonstrates C3-4 disc replacement, C5-6, C6-7 ACDF with all hardware well placed \par \par

## 2023-03-03 NOTE — PHYSICAL EXAM
[5___] : right grasp 5[unfilled]/5 [Left Ulnar Forearm] : left ulnar forearm [Right Ulnar Forearm] : left ulnar forearm [4___] : left quadriceps 4[unfilled]/5 [Bilateral] : elbow bilaterally [de-identified] : Constitutional:\par - General Appearance:\par Unremarkable\par Body Habitus\par Well Developed\par Well Nourished\par Body Habitus\par No Deformities\par Well Groomed\par Ability To communicate:\par Normal\par Neurologic:\par Global sensation is intact to upper and lower extremities. See examination of Neck and/or Spine\par for exceptions.\par Orientation to Time, Place and Person is: Normal\par Mood And Affect is Normal\par Skin:\par - Head/Face, Right Upper/Lower Extremity, Left Upper/Lower Extremity: Normal\par See Examination of Neck and/or Spine for exceptions\par Cardiovascular:\par Peripheral Cardiovascular System is Normal\par Palpation of Lymph Nodes:\par Normal Palpation of lymph nodes in: Axilla, Cervical, Inguinal\par Abnormal Palpation of lymph nodes in: None  [] : clonus not sustained at ankle [FreeTextEntry3] : 3 parallel incisions, left flank incision, anterior incision from ALIF  [de-identified] : L2, L3, L4 and L5 paresthesias on the left  [FreeTextEntry1] : DLIF L2-3, L3-4 ALIF L4-5 MIS instrumentation, L2-5 with no abnormalities L1-2 has moderate degenerative disease, flex/ex no instability

## 2023-03-03 NOTE — ASSESSMENT
[FreeTextEntry1] : 66 y/o male with cervical radiculopathy and lumbar radiculopathy. Patient will obtain an EMG of upper extremity for peripheral compression and lower extremity for chronic radiculopathy vs. lumbar plexus injury. Patient will obtain a repeat lumbar MRI with and without contrast to evaluate for progressive stenosis of L1-2. Patient will follow up in 1-2 weeks to review MRI and EMG results. \par \par Prior to appointment and during encounter with patient extensive medical records were reviewed including but not limited to, hospital records, outpatient records, imaging results, and lab data.During this appointment the patient was examined, diagnoses were discussed and explained in a face to face manner. In addition extensive time was spent reviewing aforementioned diagnostic studies. Counseling including abnormal image results, differential diagnoses, treatment options, risk and benefits, lifestyle changes, current condition, and current medications was performed. Patient's comments, questions, and concerns were addressed and patient verbalized understanding. Based on this patient's presentation at our office, which is an orthopedic spine surgeon's office, this patient inherently / intrinsically has a risk, however minute, of developing issues such as Cauda equina syndrome, bowel and bladder changes, or progression of motor or neurological deficits such as paralysis which may be permanent. \par \par I, Lazara Beltran, attest that this documentation has been prepared under the direction and in the presence of provider Guillermo Bender MD.

## 2023-03-04 PROBLEM — M17.11 ARTHRITIS OF KNEE, RIGHT: Status: ACTIVE | Noted: 2022-10-03

## 2023-03-04 NOTE — PHYSICAL EXAM
[de-identified] : The patient is a well appearing 66 year old male of their stated age.\par Patient ambulates with a ANTALGIC gait.\par Negative straight leg raise bilateral\par \par General: in no acute distress, seated comfortably, moving easily\par Skin: No discoloration, rashes; on palpation skin is dry, \par Neuro: Normal sensation all dermatomes, motor all myotomes\par Vascular: Normal pulses, no edema, normal temperature\par Coordination and balance: Normal\par Psych: normal mood and affect, non pressured speech, alert and oriented x3\par \par Right Knee:                         	\par ROM:  0-145 degrees\par \par Lachman: Negative\par Pivot Shift: Negative\par Anterior Drawer: Negative\par Posterior Drawer / Sag: Negative\par Varus Stress 0 degrees: Stable\par Varus Stress 30 degrees: Stable\par Valgus Stress 0 degrees: Stable\par Valgus Stress 30 degrees: Stable\par Medial Nan: Positive\par Lateral Nan: Negative\par Patella Glide: 2+\par Patella Apprehension: Negative\par Patella Grind: Positive\par \par Palpation:\par Medial Joint Line: TTP\par Lateral Joint Line: TTP\par Medial Collateral Ligament: Nontender\par Lateral Collateral Ligament/PLC: Nontender\par Distal Femur: Nontender\par Proximal Tibia: Nontender\par Tibial Tubercle: Nontender\par Distal Pole Patella: Nontender\par Quadriceps Tendon: Nontender &  Intact\par Patella Tendon: Nontender &  Intact\par Medial Distal Hamstring/PES: Nontender\par Lateral Distal Hamstring: Nontender & Stable\par Iliotibial Band: Nontender\par Medial Patellofemoral Ligament: Nontender\par Adductor: Nontender\par Proximal GSC-Plantaris: Nontender\par Calf: Supple & Nontender\par \par Inspection:\par Deformity: No\par Erythema: No\par Ecchymosis: No\par Abrasions: No\par Effusion: Moderate\par Prepatellar Bursitis: No\par \par Neurologic Exam:\par Sensation L4-S1: Grossly Intact\par \par Motor Exam:\par Quadriceps: 5 out of 5\par Hamstrings: 5 out of 5\par EHL: 5 out of 5\par FHL: 5 out of 5\par TA: 5 out of 5\par GS: 5 out of 5\par \par Circulatory/Pulses:\par Dorsalis Pedis: 2+\par Posterior Tibialis: 2+\par \par Additional Pertinent Findings: None\par \par Left Knee:                           	\par ROM:  0-145 degrees\par \par Lachman: Negative\par Pivot Shift: Negative\par Anterior Drawer: Negative\par Posterior Drawer / Sag: Negative\par Varus Stress 0 degrees: Stable\par Varus Stress 30 degrees: Stable\par Valgus Stress 0 degrees: Stable\par Valgus Stress 30 degrees: Stable\par Medial Nan: Positive\par Lateral Nan: Negative\par Patella Glide: 2+\par Patella Apprehension: Negative\par Patella Grind: Positive\par \par Palpation:\par Medial Joint Line: TTP\par Lateral Joint Line: TTP\par Medial Collateral Ligament: Nontender\par Lateral Collateral Ligament/PLC: Nontender\par Distal Femur: Nontender\par Proximal Tibia: Nontender\par Tibial Tubercle: Nontender\par Distal Pole Patella: Nontender\par Quadriceps Tendon: Nontender &  Intact\par Patella Tendon: Nontender &  Intact\par Medial Distal Hamstring/PES: Nontender\par Lateral Distal Hamstring: Nontender & Stable\par Iliotibial Band: Nontender\par Medial Patellofemoral Ligament: Nontender\par Adductor: Nontender\par Proximal GSC-Plantaris: Nontender\par Calf: Supple & Nontender\par \par Inspection:\par Deformity: No\par Erythema: No\par Ecchymosis: No\par Abrasions: No\par Effusion: Moderate\par Prepatellar Bursitis: No\par \par Neurologic Exam:\par Sensation L4-S1: Grossly Intact\par \par Motor Exam:\par Quadriceps: 5 out of 5\par Hamstrings: 5 out of 5\par EHL: 5 out of 5\par FHL: 5 out of 5\par TA: 5 out of 5\par GS: 5 out of 5\par \par Circulatory/Pulses:\par Dorsalis Pedis: 2+\par Posterior Tibialis: 2+

## 2023-03-04 NOTE — DISCUSSION/SUMMARY
[de-identified] : The natural progression of osteoarthritis was explained to the patient.  We discussed the possible treatment options from conservative to operative.  These included NSAIDS, Glucosamine and Chondrotin sulfate, and Physical Therapy as well different types of injections.  We also discussed that at some point they may progress to needed a TKA.  Information and pamphlets were given.\par \par We discussed their diagnosis and treatment options at length including surgical and non-surgical options. We will first attempt conservative treatment with activity modification, PT, icing, weight loss, and anti-inflammatory medications. We discussed the possible of injections (steroid and viscosupplementation) in the future. The patient was provided with a PT prescription to work on ROM, hip ER/abductors strengthening, quad/hamstring stretches and strengthening, and other exercises on the Knee Arthritis Protocol.\par \par Dx / Natural History:\par The patient was advised of the diagnosis.  The natural history of the pathology was explained in full to the patient in layman's terms.  Several different treatment options were discussed and explained in full to the patient including the risks and benefits of both surgical and non-surgical treatments.  All questions and concerns were answered. \par \par Pain Guide Activities:\par The patient was advised to let pain guide the gradual advancement of activities.\par \par Physical Therapy:\par The patient was provided with a prescription for Physical Therapy.\par \par Icing:\par The patient was advised to apply ice (wrapped in a towel or protective covering) to the area daily (20 minutes at a time, 2-4X/day).\par \par Follow up in 4-6 weeks to re-evaluate.

## 2023-03-04 NOTE — HISTORY OF PRESENT ILLNESS
[de-identified] : 02/27/2023 \par \micheal RAGSDALE is presenting today for followup. Pain and symptoms are somewhat improved. He has been taking NSAIDs. He denies any numbness/tingling/fevers/chills.

## 2023-03-04 NOTE — PROCEDURE
[FreeTextEntry3] : Patient Identification \par Name/: Verbal with patient and/or family \par  \par Procedure Verification: \par Procedure confirmed with patient or family/designee \par Consent for procedure: Verbal Consent Given \par Relevant documentation completed, reviewed, and signed \par Clinical indications for procedure confirmed \par  \par Time-out with all members of procedure team immediately prior to procedure: \par Correct patient identified. Agreement on procedure. Correct side and site. \par  \par US GUIDANCE KNEE INJECTION (STEROID) - RIGHT \par After verbal consent and identification of the correct patient and correct site, the superolateral right knee was prepped using alcohol swabs and betadine. This was allowed time to air dry. A mixture of 1cc Celestone 6mg/ml, 2cc Lidocaine 1%, and 2cc Bupivacaine 0.5% was injected with US guidance into the suprapatellar pouch using a sterile 22G needle after ethyl chloride spray for skin anesthesia. The patient tolerated the procedure well. After-care instructions were provided and included instructions to ice the area and to call if redness, pain, or fever develop.Visualization of the needle and placement of the injection was performed without any complications. Ultrasound was used for visualization, precise injection in area of tear, and / or prior failure or difficult injection

## 2023-03-09 NOTE — DATA REVIEWED
[FreeTextEntry1] : EMG - Dr. 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, mod to severe R, mod L cubital tunnel syndrome, mod R, mild L carpal tunnel syndrome, no cervical radiculopathy \par \par On my interpretation of these images from [facility] on 02/09/23 compared to 07/20/2020 s/p L2-5 fusion \par L5-S1: prior right discectomy, right side disc protrusion extending into the foramen causing compression of the R L5 root, mod stenosis, mild impingement upon the S1 root, appears unchanged at L5-S1 compared to 07/2020 \par L1-2: mod DDD, right sided tilt, mild L fs, mod R fs which when compared to 2020 is mildly progressed \par cysts on the left kidney \par \par On my interpretation of these images from [facility] on 2/3/22 demonstrates L2-3, L3-5 DLIF along with an L4-5 ALIF MIS instrumentation L2-5 \par \par On my interpretation of these images from [facility] on 2/3/22 demonstrates C3-4 disc replacement, C5-6, C6-7 ACDF with all hardware well placed \par \par

## 2023-03-09 NOTE — ASSESSMENT
[FreeTextEntry1] : 68 y/o male with cervical radiculopathy and lumbar radiculopathy. Reviewed EMG and MRI results today. \par \par In regards to cervical: \par Patient was referred to hand surgeon for numbness of b/l UE, R>L, most c/w peripheral compression\par \par In regards to lumbar: \par Due to patient experiencing L2 nerve compression & radiculopathy, patient was referred to pain management for possible left L2-3 TFESI vs. DCS. \par \par Follow up as needed. \par \par Prior to appointment and during encounter with patient extensive medical records were reviewed including but not limited to, hospital records, outpatient records, imaging results, and lab data.During this appointment the patient was examined, diagnoses were discussed and explained in a face to face manner. In addition extensive time was spent reviewing aforementioned diagnostic studies. Counseling including abnormal image results, differential diagnoses, treatment options, risk and benefits, lifestyle changes, current condition, and current medications was performed. Patient's comments, questions, and concerns were addressed and patient verbalized understanding. Based on this patient's presentation at our office, which is an orthopedic spine surgeon's office, this patient inherently / intrinsically has a risk, however minute, of developing issues such as Cauda equina syndrome, bowel and bladder changes, or progression of motor or neurological deficits such as paralysis which may be permanent. \par \par I, Lazara Beltran, attest that this documentation has been prepared under the direction and in the presence of provider Guillermo Bender MD.

## 2023-03-09 NOTE — HISTORY OF PRESENT ILLNESS
[Lower back] : lower back [7] : 7 [5] : 5 [Sharp] : sharp [Constant] : constant [Rest] : rest [Meds] : meds [Physical therapy] : physical therapy [Walking] : walking [de-identified] : 03/03/2023: Patient is a follow up. Patient is still experiencing numbness/tingling in the b/l UE, R>L. In the morning, patient low back L>R is 7/10 depending on the activity. On the left side, his groin to buttocks is numb. Symptoms are the same as last visit. \par \par 2/3/23: Patient is a follow up. Patient has an extensive hx for lumbar and cervical surgeries. Patient went to physical therapy which provided slight relief for his pain. lumbar 5/10, more numbness than pain in left > right and weakness in the left. Patients neck pain has slightly improved.  [de-identified] : EMG,MRI [de-identified] : pt

## 2023-03-09 NOTE — PHYSICAL EXAM
[5___] : right grasp 5[unfilled]/5 [Left Ulnar Forearm] : left ulnar forearm [Right Ulnar Forearm] : left ulnar forearm [4___] : left quadriceps 4[unfilled]/5 [Bilateral] : elbow bilaterally [de-identified] : Constitutional:\par - General Appearance:\par Unremarkable\par Body Habitus\par Well Developed\par Well Nourished\par Body Habitus\par No Deformities\par Well Groomed\par Ability To communicate:\par Normal\par Neurologic:\par Global sensation is intact to upper and lower extremities. See examination of Neck and/or Spine\par for exceptions.\par Orientation to Time, Place and Person is: Normal\par Mood And Affect is Normal\par Skin:\par - Head/Face, Right Upper/Lower Extremity, Left Upper/Lower Extremity: Normal\par See Examination of Neck and/or Spine for exceptions\par Cardiovascular:\par Peripheral Cardiovascular System is Normal\par Palpation of Lymph Nodes:\par Normal Palpation of lymph nodes in: Axilla, Cervical, Inguinal\par Abnormal Palpation of lymph nodes in: None  [] : clonus not sustained at ankle [FreeTextEntry3] : 3 parallel incisions, left flank incision, anterior incision from ALIF  [de-identified] : L2, L3, L4 and L5 paresthesias on the left  [FreeTextEntry1] : DLIF L2-3, L3-4 ALIF L4-5 MIS instrumentation, L2-5 with no abnormalities L1-2 has moderate degenerative disease, flex/ex no instability

## 2023-03-20 ENCOUNTER — APPOINTMENT (OUTPATIENT)
Dept: ORTHOPEDIC SURGERY | Facility: CLINIC | Age: 68
End: 2023-03-20

## 2023-03-21 NOTE — ASU PREOP CHECKLIST - HIBICLENS SHOWER 3 DATE
06-May-2022 08:13 Consent 2/Introductory Paragraph: Mohs surgery was explained to the patient and consent was obtained. The risks, benefits and alternatives to therapy were discussed in detail. Specifically, the risks of infection, scarring, bleeding, prolonged wound healing, incomplete removal, allergy to anesthesia, nerve injury and recurrence were addressed. Prior to the procedure, the treatment site was clearly identified and confirmed by the patient. All components of Universal Protocol/PAUSE Rule completed.

## 2023-04-05 NOTE — PATIENT PROFILE ADULT - FUNCTIONAL SCREEN CURRENT LEVEL: SWALLOWING (IF SCORE 2 OR MORE FOR ANY ITEM, CONSULT REHAB SERVICES), MLM)
No significant abnormality noted on echo, follow as scheduled 0 = swallows foods/liquids without difficulty

## 2023-06-07 ENCOUNTER — APPOINTMENT (OUTPATIENT)
Dept: ORTHOPEDIC SURGERY | Facility: CLINIC | Age: 68
End: 2023-06-07
Payer: COMMERCIAL

## 2023-06-07 VITALS — BODY MASS INDEX: 31.15 KG/M2 | HEIGHT: 72 IN | WEIGHT: 230 LBS

## 2023-06-07 DIAGNOSIS — M48.061 SPINAL STENOSIS, LUMBAR REGION WITHOUT NEUROGENIC CLAUDICATION: ICD-10-CM

## 2023-06-07 PROCEDURE — 99214 OFFICE O/P EST MOD 30 MIN: CPT

## 2023-06-08 ENCOUNTER — APPOINTMENT (OUTPATIENT)
Dept: PAIN MANAGEMENT | Facility: CLINIC | Age: 68
End: 2023-06-08
Payer: COMMERCIAL

## 2023-06-08 VITALS — BODY MASS INDEX: 31.15 KG/M2 | HEIGHT: 72 IN | WEIGHT: 230 LBS

## 2023-06-08 PROCEDURE — 99214 OFFICE O/P EST MOD 30 MIN: CPT

## 2023-06-08 NOTE — PHYSICAL EXAM
[de-identified] : Constitutional; Appears well, no apparent distress\par Ability to communicate: Normal \par Respiratory: non-labored breathing\par Skin: No rash noted\par Head: Normocephalic, atraumatic\par Neck: no visible thyroid enlargement\par Eyes: Extraocular movements intact\par Neurologic: Alert and oriented x3\par Psychiatric: normal mood, affect and behavior\par  [] : no ecchymosis

## 2023-06-08 NOTE — DISCUSSION/SUMMARY
[de-identified] : After discussing various treatment options with the patient including but not limited to oral medications, physical therapy, exercise modalities as well as interventional spinal injections, we have decided with the following plan:\par \par - Continue Home exercises, stretching, activity modification, physical therapy, and conservative care.\par - MRI report and/or images was reviewed and discussed with the patient.\par - Recommend Left 2-3 Transforaminal Epidural Steroid Injection under fluoroscopic guidance with image.\par - The risks, benefits and alternatives of the proposed procedure were explained in detail with the patient. The risks outlined include but are not limited to infection, bleeding, post-dural puncture headache, nerve injury, a temporary increase in pain, failure to resolve symptoms, allergic reaction, symptom recurrence, and possible elevation of blood sugar in diabetics. All questions were answered to patient's apparent satisfaction and he/she verbalized an understanding.\par - Patient is presenting with acute/sub-acute radicular pain with impairment in ADLs and functionality.  The pain has not responded to conservative care including NSAID therapy and/or physical therapy.  There is no bleeding tendency, unstable medical condition, or systemic infection.\par - Follow up in 1-2 weeks post injection for re-evaluation.\par - Can consider possible SCS

## 2023-06-08 NOTE — HISTORY OF PRESENT ILLNESS
[Lower back] : lower back [6] : 6 [2] : 2 [Dull/Aching] : dull/aching [Sharp] : sharp [Shooting] : shooting [Intermittent] : intermittent [Household chores] : household chores [Meds] : meds [Nothing helps with pain getting better] : Nothing helps with pain getting better [Standing] : standing [Walking] : walking [FreeTextEntry1] : Initial HPI 06/08/23:\par Pain started many years ago and has had multiple lumbar spine surgeries. This pain started after the past surgery in Dec 2021. Pain is across the lower back but has numbness radiating into the left buttock and groin.  Saw Dr. Bender who recommended Left L2-3 TFESI and possible SCS. \par \par MRI Lumbar Spine 2/9/23 independently reviewed: Left L1-2 LR stenosis \par Conservative Care: Has done PT and now will be restarting. \par Pain Medications: advil and hydrocodone (from PCP) \par Past Injections: had ESIs in the past \par Spine surgery: L2-5 laminectomy/fusion \par Blood thinners: none\par \par  [] : no [FreeTextEntry7] : RIGHT BUTTOCK  [de-identified] : L MRI

## 2023-06-12 NOTE — HISTORY OF PRESENT ILLNESS
[4] : 4 [2] : 2 [Disabled] : Work status: disabled [de-identified] : 06/07/2023: Patient presenting today for a FUV. Patient reports lumbar pain and numbness in left glute. Patient reports pain is worse depending on day and activity. Patient has been treating with physical therapy and states it provides him relief in symptoms. Patient reports he did not see pain management for TESIs.\par \par 03/03/2023: Patient is a follow up. Patient is still experiencing numbness/tingling in the b/l UE, R>L. In the morning, patient low back L>R is 7/10 depending on the activity. On the left side, his groin to buttocks is numb. Symptoms are the same as last visit. \par \par 2/3/23: Patient is a follow up. Patient has an extensive hx for lumbar and cervical surgeries. Patient went to physical therapy which provided slight relief for his pain. lumbar 5/10, more numbness than pain in left > right and weakness in the left. Patients neck pain has slightly improved.  [de-identified] : p/t

## 2023-06-12 NOTE — ASSESSMENT
[FreeTextEntry1] : 66 y/o male with foraminal stenosis and s/p lami L2-5. Due to patient experiencing L2 nerve compression & radiculopathy, patient was referred to pain management for possible left L2-3 TFESI vs. DCS. Patient was provided with a referral to continue lumbar physical therapy to work on stretching, strengthening and range of motion. Follow up as needed. \par \par Prior to appointment and during encounter with patient extensive medical records were reviewed including but not limited to, hospital records, outpatient records, imaging results, and lab data.During this appointment the patient was examined, diagnoses were discussed and explained in a face to face manner. In addition extensive time was spent reviewing aforementioned diagnostic studies. Counseling including abnormal image results, differential diagnoses, treatment options, risk and benefits, lifestyle changes, current condition, and current medications was performed. Patient's comments, questions, and concerns were addressed and patient verbalized understanding. Based on this patient's presentation at our office, which is an orthopedic spine surgeon's office, this patient inherently / intrinsically has a risk, however minute, of developing issues such as Cauda equina syndrome, bowel and bladder changes, or progression of motor or neurological deficits such as paralysis which may be permanent. \par \par JANELLE, Samira Padilla, attest that this documentation has been prepared under the direction and in the presence of provider Guillermo Bender MD.

## 2023-06-12 NOTE — PHYSICAL EXAM
[4___] : left finger abductors 4[unfilled]/5 [5___] : right grasp 5[unfilled]/5 [Left Ulnar Forearm] : left ulnar forearm [Right Ulnar Forearm] : left ulnar forearm [Bilateral] : elbow bilaterally [de-identified] : Constitutional:\par - General Appearance:\par Unremarkable\par Body Habitus\par Well Developed\par Well Nourished\par Body Habitus\par No Deformities\par Well Groomed\par Ability To communicate:\par Normal\par Neurologic:\par Global sensation is intact to upper and lower extremities. See examination of Neck and/or Spine\par for exceptions.\par Orientation to Time, Place and Person is: Normal\par Mood And Affect is Normal\par Skin:\par - Head/Face, Right Upper/Lower Extremity, Left Upper/Lower Extremity: Normal\par See Examination of Neck and/or Spine for exceptions\par Cardiovascular:\par Peripheral Cardiovascular System is Normal\par Palpation of Lymph Nodes:\par Normal Palpation of lymph nodes in: Axilla, Cervical, Inguinal\par Abnormal Palpation of lymph nodes in: None  [] : non-antalgic [FreeTextEntry3] : 3 parallel incisions, left flank incision, anterior incision from ALIF  [de-identified] : L2, L3, L4 and L5 paresthesias on the left. Chronic numbness in L4-5 [FreeTextEntry1] : DLIF L2-3, L3-4 ALIF L4-5 MIS instrumentation, L2-5 with no abnormalities L1-2 has moderate degenerative disease, flex/ex no instability  [TWNoteComboBox7] : forward flexion 45 degrees [de-identified] : extension 10 degrees [de-identified] : left lateral bending 10 degrees [de-identified] : right lateral bending 10 degrees [TWNoteComboBox6] : False

## 2023-06-22 ENCOUNTER — APPOINTMENT (OUTPATIENT)
Dept: PAIN MANAGEMENT | Facility: CLINIC | Age: 68
End: 2023-06-22
Payer: COMMERCIAL

## 2023-06-22 DIAGNOSIS — M54.16 RADICULOPATHY, LUMBAR REGION: ICD-10-CM

## 2023-06-22 PROCEDURE — 64483 NJX AA&/STRD TFRM EPI L/S 1: CPT | Mod: LT

## 2023-06-22 PROCEDURE — J3490M: CUSTOM

## 2023-06-22 NOTE — PROCEDURE
[FreeTextEntry3] : Date of Service: 06/22/2023 \par \par Account: [id number]\par \par Patient: JN UNDERWOOD \par \par YOB: 1955\par \par Age: 67 year\par \par \par Surgeon:                                                         Guillermo Isidro D.O. \par \par Assistant:                                                         None\par \par Pre-Operative Diagnosis:                             Lumbosacral radiculitis (M54.17)\par \par Post-Operative Diagnosis:                           Same\par \par Procedure:                                                      Left L2-3 transforaminal epidural steroid injection under fluoroscopic guidance.\par \par Anesthesia:                                                     Local with MAC\par \par \par This procedure was carried out using fluoroscopic guidance.  The risks and benefits of the procedure were discussed extensively with the patient.  The consent of the patient was obtained and the following procedure was performed. The patient was placed in the prone position on the fluoroscopic table and the lumbar area was prepped and draped in a sterile fashion. A timeout was performed with all essential staff present and the site and side were verified.\par \par The left L2-3 neural foramen was identified on left oblique "migel dog" anatomical view at the 6 o'clock position using fluoroscopic guidance, and the area was marked. The overlying skin and subcutaneous structures were anesthetized using sterile technique with 1% Lidocaine.  A 22-gauge spinal needle was directed toward the inferior (6 o'clock) position of the pedicle, which formed the roof of the identified foramen.  Once in the epidural space, after negative aspiration for heme and CSF, 1cc of Omnipaque contrast was injected under live fluoroscopy to confirm epidural location and assess filling defects and rule out intravascular needle placement. \par \par The following contrast flow and epidurogram was observed: no intravascular or intrathecal flow pattern was noted.  No blood or CSF was aspirated. Contrast spread appeared to outline the left L2 nerve root and spread medially into the epidural space. \par \par After this, an injectate of 1 cc preservative free normal saline plus 4 mg of dexamethasone and 1 cc of 0.25% bupivacaine was injected in the epidural space.\par \par The needle was subsequently removed.  Vital signs remained normal.  Pulse oximeter was used throughout the procedure and the patient's pulse and oxygen saturation remained within normal limits.  The patient tolerated the procedure well.  There were no complications. The patient was instructed to apply ice over the injection sites for twenty minutes every two hours for the next 24 to 48 hours.  The patient was also instructed to contact me immediately if there were any problems.\par \par Guillermo Isidro D.O.\par

## 2023-07-06 ENCOUNTER — APPOINTMENT (OUTPATIENT)
Dept: PAIN MANAGEMENT | Facility: CLINIC | Age: 68
End: 2023-07-06
Payer: COMMERCIAL

## 2023-07-06 VITALS — BODY MASS INDEX: 31.15 KG/M2 | WEIGHT: 230 LBS | HEIGHT: 72 IN

## 2023-07-06 DIAGNOSIS — Z98.1 ARTHRODESIS STATUS: ICD-10-CM

## 2023-07-06 PROCEDURE — 99214 OFFICE O/P EST MOD 30 MIN: CPT

## 2023-07-06 NOTE — DISCUSSION/SUMMARY
[de-identified] : After discussing various treatment options with the patient including but not limited to oral medications, physical therapy, exercise modalities as well as interventional spinal injections, we have decided with the following plan:\par \par - Continue Home exercises, stretching, activity modification, physical therapy, and conservative care.\par - MRI report and/or images was reviewed and discussed with the patient.\par - Recommend First Diagnostic RIGHT L2,3,4,5 Medial Branch Blocks under fluoroscopic guidance with image.\par - Patient presents with axial lumbar pain that has not responded to 3 months of conservative therapy including physical therapy or NSAID therapy.  The pain is interfering with activities of daily living and functionality. There is no radicular pain. The pain is exacerbated by facet loading / positive Kemps maneuver which is defined by pain reproduction with extension and rotation of the lumbar spine to the affected side.  The patient has not had a vertebral fusion at the levels of the proposed treatment.  There is no unexplained neurologic deficit.  There is no history of systemic infection, unstable medical condition, bleeding tendency, or local infection.  The injection is being performed to diagnose the facet joint as the source of the individual's pain, in preparation for a radiofrequency ablation. \par - The risks, benefits, contents and alternatives to injection were explained in full to the patient.  Risks outlined include but are not limited to infection, sepsis, bleeding, post-dural puncture headache, nerve damage, temporary increase in pain, syncopal episode, failure to resolve symptoms, allergic reaction, symptom recurrence, and elevation of blood sugar in diabetics. Cortisone may cause immunosuppression.  Patient understands the risks.  All questions were answered.  After discussion of options, patient requested an injection.  Information regarding the injection was given to the patient.  Which medications to stop prior to the injection was explained to the patient as well.\par - Follow up in 1-2 weeks post injection for re-evaluation.\par - Can consider possible SCS

## 2023-07-06 NOTE — HISTORY OF PRESENT ILLNESS
[Lower back] : lower back [6] : 6 [2] : 2 [Dull/Aching] : dull/aching [Sharp] : sharp [Shooting] : shooting [Intermittent] : intermittent [Household chores] : household chores [Meds] : meds [Nothing helps with pain getting better] : Nothing helps with pain getting better [Standing] : standing [Walking] : walking [FreeTextEntry1] : 07/06/2023: s/p LEFT L2-3 TFESI on 06/22/23 with no relief. Pain is now on the right lower back and radiates into the right buttock. \par \par Initial HPI 06/08/23:\par Pain started many years ago and has had multiple lumbar spine surgeries. This pain started after the past surgery in Dec 2021. Pain is across the lower back but has numbness radiating into the left buttock and groin.  Saw Dr. Bender who recommended Left L2-3 TFESI and possible SCS. \par \par MRI Lumbar Spine 2/9/23 independently reviewed: Left L1-2 LR stenosis \par Conservative Care: Has done PT and now will be restarting. \par Pain Medications: advil and hydrocodone (from PCP) \par Past Injections: had ESIs in the past \par Spine surgery: L2-5 laminectomy/fusion \par Blood thinners: none\par \par  [] : no [FreeTextEntry7] : RIGHT BUTTOCK  [de-identified] : L MRI

## 2023-07-06 NOTE — PHYSICAL EXAM
[de-identified] : Constitutional; Appears well, no apparent distress\par Ability to communicate: Normal \par Respiratory: non-labored breathing\par Skin: No rash noted\par Head: Normocephalic, atraumatic\par Neck: no visible thyroid enlargement\par Eyes: Extraocular movements intact\par Neurologic: Alert and oriented x3\par Psychiatric: normal mood, affect and behavior\par  [] : light touch intact throughout both lower extremities

## 2023-07-19 ENCOUNTER — APPOINTMENT (OUTPATIENT)
Dept: PAIN MANAGEMENT | Facility: CLINIC | Age: 68
End: 2023-07-19

## 2023-07-24 ENCOUNTER — APPOINTMENT (OUTPATIENT)
Age: 68
End: 2023-07-24
Payer: COMMERCIAL

## 2023-07-24 PROCEDURE — 64494 INJ PARAVERT F JNT L/S 2 LEV: CPT | Mod: 59,RT

## 2023-07-24 PROCEDURE — 64495 INJ PARAVERT F JNT L/S 3 LEV: CPT | Mod: 59,RT

## 2023-07-24 PROCEDURE — 64493 INJ PARAVERT F JNT L/S 1 LEV: CPT | Mod: RT

## 2023-08-03 ENCOUNTER — APPOINTMENT (OUTPATIENT)
Dept: PAIN MANAGEMENT | Facility: CLINIC | Age: 68
End: 2023-08-03
Payer: COMMERCIAL

## 2023-08-03 VITALS — WEIGHT: 230 LBS | BODY MASS INDEX: 31.15 KG/M2 | HEIGHT: 72 IN

## 2023-08-03 PROCEDURE — 99214 OFFICE O/P EST MOD 30 MIN: CPT

## 2023-08-03 RX ORDER — MELOXICAM 15 MG/1
15 TABLET ORAL
Qty: 30 | Refills: 0 | Status: DISCONTINUED | COMMUNITY
Start: 2022-08-25 | End: 2023-08-03

## 2023-08-03 NOTE — PHYSICAL EXAM
[de-identified] : Constitutional; Appears well, no apparent distress\par  Ability to communicate: Normal \par  Respiratory: non-labored breathing\par  Skin: No rash noted\par  Head: Normocephalic, atraumatic\par  Neck: no visible thyroid enlargement\par  Eyes: Extraocular movements intact\par  Neurologic: Alert and oriented x3\par  Psychiatric: normal mood, affect and behavior\par   [] : negative James maneuver facet loading

## 2023-08-03 NOTE — HISTORY OF PRESENT ILLNESS
[Lower back] : lower back [6] : 6 [2] : 2 [Dull/Aching] : dull/aching [Sharp] : sharp [Shooting] : shooting [Intermittent] : intermittent [Household chores] : household chores [Meds] : meds [Nothing helps with pain getting better] : Nothing helps with pain getting better [Standing] : standing [Walking] : walking [FreeTextEntry1] : 08/03/2023: s/p first diagnostic RIGHT L2-5 MBB on 07/24/23 with >80% relief and improvement of ADLS. Pain was great for 2 days and then started to return.   07/06/2023: s/p LEFT L2-3 TFESI on 06/22/23 with no relief. Pain is now on the right lower back and radiates into the right buttock.   Initial HPI 06/08/23: Pain started many years ago and has had multiple lumbar spine surgeries. This pain started after the past surgery in Dec 2021. Pain is across the lower back but has numbness radiating into the left buttock and groin.  Saw Dr. Bender who recommended Left L2-3 TFESI and possible SCS.   MRI Lumbar Spine 2/9/23 independently reviewed: Left L1-2 LR stenosis  Conservative Care: Has done PT and now will be restarting.  Pain Medications: advil and hydrocodone (from PCP)  Past Injections: had ESIs in the past  Spine surgery: L2-5 laminectomy/fusion  Blood thinners: none   [] : no [FreeTextEntry7] : RIGHT BUTTOCK  [de-identified] : L MRI  [TWNoteComboBox1] : 50%

## 2023-08-03 NOTE — DISCUSSION/SUMMARY
[de-identified] : After discussing various treatment options with the patient including but not limited to oral medications, physical therapy, exercise modalities as well as interventional spinal injections, we have decided with the following plan:  - Continue Home exercises, stretching, activity modification, physical therapy, and conservative care. - MRI report and/or images was reviewed and discussed with the patient. - Recommend First Diagnostic RIGHT L2,3,4,5 Medial Branch Blocks under fluoroscopic guidance with image. - Patient presents with axial lumbar pain that has not responded to 3 months of conservative therapy including physical therapy or NSAID therapy.  The pain is interfering with activities of daily living and functionality. There is no radicular pain. The pain is exacerbated by facet loading / positive Kemps maneuver which is defined by pain reproduction with extension and rotation of the lumbar spine to the affected side.  The patient has not had a vertebral fusion at the levels of the proposed treatment.  There is no unexplained neurologic deficit.  There is no history of systemic infection, unstable medical condition, bleeding tendency, or local infection.  The injection is being performed to diagnose the facet joint as the source of the individual's pain, in preparation for a radiofrequency ablation.  - The risks, benefits, contents and alternatives to injection were explained in full to the patient.  Risks outlined include but are not limited to infection, sepsis, bleeding, post-dural puncture headache, nerve damage, temporary increase in pain, syncopal episode, failure to resolve symptoms, allergic reaction, symptom recurrence, and elevation of blood sugar in diabetics. Cortisone may cause immunosuppression.  Patient understands the risks.  All questions were answered.  After discussion of options, patient requested an injection.  Information regarding the injection was given to the patient.  Which medications to stop prior to the injection was explained to the patient as well. - Follow up in 1-2 weeks post injection for re-evaluation.  - Recommend Diclofenac 75mg BID PRN. Potential adverse effects including but not limited to bleeding, ulcers, increased risk of hypertension, heart disease, kidney disease and stroke were discussed with the patient.  Medication would allow patient to be more mobile and perform ADL's.  Will continue to monitor patient and attempt to wean as soon as possible. Will use the lowest dosage possible for the shortest possible period of time. - Can consider possible SCS

## 2023-08-10 ENCOUNTER — APPOINTMENT (OUTPATIENT)
Dept: PAIN MANAGEMENT | Facility: CLINIC | Age: 68
End: 2023-08-10

## 2023-08-30 ENCOUNTER — APPOINTMENT (OUTPATIENT)
Age: 68
End: 2023-08-30

## 2023-08-30 ENCOUNTER — RX RENEWAL (OUTPATIENT)
Age: 68
End: 2023-08-30

## 2023-08-30 ENCOUNTER — APPOINTMENT (OUTPATIENT)
Age: 68
End: 2023-08-30
Payer: COMMERCIAL

## 2023-08-30 PROCEDURE — 64495 INJ PARAVERT F JNT L/S 3 LEV: CPT | Mod: 59,RT

## 2023-08-30 PROCEDURE — 64493 INJ PARAVERT F JNT L/S 1 LEV: CPT | Mod: RT

## 2023-08-30 PROCEDURE — 64494 INJ PARAVERT F JNT L/S 2 LEV: CPT | Mod: 59,RT

## 2023-09-14 ENCOUNTER — APPOINTMENT (OUTPATIENT)
Dept: PAIN MANAGEMENT | Facility: CLINIC | Age: 68
End: 2023-09-14
Payer: COMMERCIAL

## 2023-09-14 VITALS — WEIGHT: 225 LBS | HEIGHT: 72 IN | BODY MASS INDEX: 30.48 KG/M2

## 2023-09-14 DIAGNOSIS — M54.50 LOW BACK PAIN, UNSPECIFIED: ICD-10-CM

## 2023-09-14 DIAGNOSIS — M47.816 SPONDYLOSIS W/OUT MYELOPATHY OR RADICULOPATHY, LUMBAR REGION: ICD-10-CM

## 2023-09-14 PROCEDURE — 99214 OFFICE O/P EST MOD 30 MIN: CPT

## 2023-09-20 ENCOUNTER — APPOINTMENT (OUTPATIENT)
Age: 68
End: 2023-09-20
Payer: COMMERCIAL

## 2023-09-20 PROCEDURE — 64636 DESTROY L/S FACET JNT ADDL: CPT

## 2023-09-20 PROCEDURE — 64635 DESTROY LUMB/SAC FACET JNT: CPT | Mod: RT

## 2023-09-27 ENCOUNTER — RX RENEWAL (OUTPATIENT)
Age: 68
End: 2023-09-27

## 2023-10-05 ENCOUNTER — APPOINTMENT (OUTPATIENT)
Dept: PAIN MANAGEMENT | Facility: CLINIC | Age: 68
End: 2023-10-05

## 2023-10-17 NOTE — PHYSICAL THERAPY INITIAL EVALUATION ADULT - AMBULATION SKILLS, REHAB EVAL
[address] [facility] Locations - No appointment necessary. ? indicates the location is open Saturdays in addition to Monday through Friday. Call your preferred location for test preparation, business hours and other information you need. [employer] accepts [employer]. CENTRAL  EAST  [place]    ? [place] [id number] [facility]. [facility], [address]    Ph: [address] [facility], [address]    Ph: [phone]   ? [address]    [place], [address] [facility]    Ph: [address], [address]    Ph: [phone] ? [place]   [address]   Ph: [phone]  ? [address] [facility], [address] [facility]    Ph: [place] [address] [facility], [address] [facility]   Ph: [phone]    NORTH    ? [place], [place] [address]    Ph: [phone]  [facility]   [address] [facility], [facility]   Ph: [address] [place], [phone] [address] [facility]: [address], [address] [place] [address]    Ph: [address] [facility].  [facility] [place], [place] [address]    Ph: [phone] independent

## 2023-10-30 ENCOUNTER — RX RENEWAL (OUTPATIENT)
Age: 68
End: 2023-10-30

## 2023-12-13 ENCOUNTER — RX RENEWAL (OUTPATIENT)
Age: 68
End: 2023-12-13

## 2024-01-09 ENCOUNTER — RX RENEWAL (OUTPATIENT)
Age: 69
End: 2024-01-09

## 2024-02-14 ENCOUNTER — RX RENEWAL (OUTPATIENT)
Age: 69
End: 2024-02-14

## 2024-03-18 ENCOUNTER — RX RENEWAL (OUTPATIENT)
Age: 69
End: 2024-03-18

## 2024-03-19 NOTE — H&P PST ADULT - EXTREMITIES
25-year-old is seen with left knee pain that has had for 3 weeks.  This is a new problem.  He was playing basketball and landed from a jump and hyperextended his knee.  He has developed severe sharp shooting pain and swelling and intermittent locking and giving way and mechanical symptoms.  Pain is mostly on the lateral side of the knee.  He has a history of a right knee ACL reconstruction.  He has used ibuprofen with mild relief.    Pleasant and in no acute distress. Ambulates with a mildly antalgic gait.  Left knee range of motion is 3-120. There is a mild effusion and no instability. The knee is stable to varus and valgus stress Lachman and posterior drawer. There is lateral joint line tenderness Pierce's is positive with pain laterally.  The right knee range of motion is 0-130 without effusion or instability. There is no tenderness around the right knee. Bilateral lower extremities are well-perfused the skin is intact and muscle tone is adequate.  There is adequate range of motion of his hips without significant pain.    Multiple x-ray views of the left knee are personally reviewed and there is no acute bony abnormality.    A discussion about his knee pain was done.  It is concerning that he sustained a lateral meniscus tear with his traumatic event and continued symptoms and exam findings.  There is potential need for knee arthroscopy depending on the MRI result.  He will be sent for an MRI of the left knee to evaluate for a meniscus tear.  He will avoid aggravating activities and use ibuprofen and ice in the meantime.   No cyanosis, clubbing or edema

## 2024-04-17 ENCOUNTER — RX RENEWAL (OUTPATIENT)
Age: 69
End: 2024-04-17

## 2024-04-17 ENCOUNTER — RX ONLY (RX ONLY)
Age: 69
End: 2024-04-17

## 2024-04-17 ENCOUNTER — OFFICE (OUTPATIENT)
Dept: URBAN - METROPOLITAN AREA CLINIC 1 | Facility: CLINIC | Age: 69
Setting detail: OPHTHALMOLOGY
End: 2024-04-17
Payer: COMMERCIAL

## 2024-04-17 DIAGNOSIS — H25.13: ICD-10-CM

## 2024-04-17 DIAGNOSIS — D31.31: ICD-10-CM

## 2024-04-17 DIAGNOSIS — H01.004: ICD-10-CM

## 2024-04-17 DIAGNOSIS — H16.223: ICD-10-CM

## 2024-04-17 DIAGNOSIS — H35.361: ICD-10-CM

## 2024-04-17 DIAGNOSIS — H10.433: ICD-10-CM

## 2024-04-17 DIAGNOSIS — H52.4: ICD-10-CM

## 2024-04-17 DIAGNOSIS — H01.001: ICD-10-CM

## 2024-04-17 PROCEDURE — 92004 COMPRE OPH EXAM NEW PT 1/>: CPT

## 2024-04-17 PROCEDURE — 92015 DETERMINE REFRACTIVE STATE: CPT

## 2024-04-17 PROCEDURE — 92134 CPTRZ OPH DX IMG PST SGM RTA: CPT

## 2024-04-17 RX ORDER — DICLOFENAC SODIUM 75 MG/1
75 TABLET, DELAYED RELEASE ORAL
Qty: 60 | Refills: 0 | Status: ACTIVE | COMMUNITY
Start: 2023-08-03 | End: 1900-01-01

## 2024-04-17 ASSESSMENT — LID POSITION - PTOSIS
OS_PTOSIS: LUL
OD_PTOSIS: RUL

## 2024-04-17 ASSESSMENT — LID EXAM ASSESSMENTS
OS_BLEPHARITIS: LUL T
OD_BLEPHARITIS: RUL T

## 2024-08-12 ENCOUNTER — APPOINTMENT (OUTPATIENT)
Dept: ORTHOPEDIC SURGERY | Facility: CLINIC | Age: 69
End: 2024-08-12

## 2024-08-30 NOTE — DISCUSSION/SUMMARY
[de-identified] : Pt tolerated procedure well, f/u in 3 months or sooner if needed.\par \par All of the patient's questions were answered to His satisfaction. Diagnoses and potential treatments were reviewed. He agreed with the plan and would like to move forward with it.\par \par History, physical exam, imaging, assessment and plan documented by Bryson Gonzalez. The documentation recorded by the scribe accurately reflects the service I, Nam Swartz MD, personally performed and the decisions made by me. 
home

## 2024-09-11 ENCOUNTER — RX RENEWAL (OUTPATIENT)
Age: 69
End: 2024-09-11

## 2024-10-08 ENCOUNTER — RX RENEWAL (OUTPATIENT)
Age: 69
End: 2024-10-08

## 2024-11-15 ENCOUNTER — RX RENEWAL (OUTPATIENT)
Age: 69
End: 2024-11-15

## 2024-12-19 ENCOUNTER — RX RENEWAL (OUTPATIENT)
Age: 69
End: 2024-12-19

## 2025-01-22 ENCOUNTER — RX RENEWAL (OUTPATIENT)
Age: 70
End: 2025-01-22

## 2025-05-23 ENCOUNTER — OFFICE (OUTPATIENT)
Dept: URBAN - METROPOLITAN AREA CLINIC 1 | Facility: CLINIC | Age: 70
Setting detail: OPHTHALMOLOGY
End: 2025-05-23
Payer: COMMERCIAL

## 2025-05-23 DIAGNOSIS — H16.223: ICD-10-CM

## 2025-05-23 DIAGNOSIS — H25.13: ICD-10-CM

## 2025-05-23 DIAGNOSIS — H35.361: ICD-10-CM

## 2025-05-23 PROCEDURE — 92014 COMPRE OPH EXAM EST PT 1/>: CPT

## 2025-05-23 PROCEDURE — 92250 FUNDUS PHOTOGRAPHY W/I&R: CPT

## 2025-05-23 ASSESSMENT — REFRACTION_MANIFEST
OS_SPHERE: +0.75
OS_AXIS: 65
OS_ADD: +2.25
OD_ADD: +2.25
OD_CYLINDER: -0.50
OS_VA1: 20/20
OD_SPHERE: +0.25
OS_SPHERE: +0.50
OS_CYLINDER: -0.50
OD_AXIS: 45
OS_ADD: +2.50
OD_ADD: +2.50
OD_AXIS: 75
OS_AXIS: 80
OS_CYLINDER: -0.50
OD_SPHERE: +1.25
OS_VA1: 20/20
OD_VA1: 20/25+2
OD_CYLINDER: -0.75
OD_VA1: 20/25

## 2025-05-23 ASSESSMENT — REFRACTION_CURRENTRX
OS_OVR_VA: 20/
OS_ADD: +2.75
OD_AXIS: 080
OS_VPRISM_DIRECTION: PROGS
OD_OVR_VA: 20/
OD_ADD: +2.75
OS_CYLINDER: -0.25
OS_SPHERE: +0.75
OS_AXIS: 100
OD_VPRISM_DIRECTION: PROGS
OD_SPHERE: +1.00
OD_CYLINDER: -0.50

## 2025-05-23 ASSESSMENT — VISUAL ACUITY
OS_BCVA: 20/30-1
OD_BCVA: 20/20-1

## 2025-05-23 ASSESSMENT — LID POSITION - PTOSIS
OD_PTOSIS: RUL
OS_PTOSIS: LUL

## 2025-05-23 ASSESSMENT — KERATOMETRY
OD_K2POWER_DIOPTERS: 46.50
OD_AXISANGLE_DEGREES: 022
OS_K2POWER_DIOPTERS: 47.25
OS_K1POWER_DIOPTERS: 46.00
OD_K1POWER_DIOPTERS: 45.75
OS_AXISANGLE_DEGREES: 009
METHOD_AUTO_MANUAL: AUTO

## 2025-05-23 ASSESSMENT — SUPERFICIAL PUNCTATE KERATITIS (SPK)
OD_SPK: T
OS_SPK: T

## 2025-05-23 ASSESSMENT — REFRACTION_AUTOREFRACTION
OS_SPHERE: +0.75
OD_CYLINDER: -0.25
OD_AXIS: 045
OD_SPHERE: +0.50
OS_AXIS: 080
OS_CYLINDER: -0.75

## 2025-05-23 ASSESSMENT — LID EXAM ASSESSMENTS
OD_BLEPHARITIS: RUL T
OS_BLEPHARITIS: LUL T

## 2025-05-23 ASSESSMENT — CONFRONTATIONAL VISUAL FIELD TEST (CVF)
OD_FINDINGS: FULL
OS_FINDINGS: FULL

## 2025-05-23 ASSESSMENT — TONOMETRY
OS_IOP_MMHG: 13
OD_IOP_MMHG: 15

## 2025-05-28 ENCOUNTER — OFFICE (OUTPATIENT)
Dept: URBAN - METROPOLITAN AREA CLINIC 1 | Facility: CLINIC | Age: 70
Setting detail: OPHTHALMOLOGY
End: 2025-05-28
Payer: COMMERCIAL

## 2025-05-28 DIAGNOSIS — H35.3131: ICD-10-CM

## 2025-05-28 DIAGNOSIS — H25.13: ICD-10-CM

## 2025-05-28 DIAGNOSIS — H01.001: ICD-10-CM

## 2025-05-28 DIAGNOSIS — H52.4: ICD-10-CM

## 2025-05-28 DIAGNOSIS — H16.223: ICD-10-CM

## 2025-05-28 PROCEDURE — 92015 DETERMINE REFRACTIVE STATE: CPT | Performed by: OPHTHALMOLOGY

## 2025-05-28 PROCEDURE — 99213 OFFICE O/P EST LOW 20 MIN: CPT | Performed by: OPHTHALMOLOGY

## 2025-05-28 ASSESSMENT — REFRACTION_MANIFEST
OD_VA1: 20/25
OD_VA1: 20/25
OS_SPHERE: +0.25
OS_CYLINDER: SPHERE
OD_AXIS: 085
OS_SPHERE: +0.25
OU_VA: 20/25
OS_VA1: 20/25
OD_ADD: +2.25
OS_ADD: +2.25
OD_ADD: +2.50
OS_VA1: 20/25
OS_CYLINDER: -0.25
OD_AXIS: 085
OD_CYLINDER: -1.00
OS_AXIS: 010
OD_CYLINDER: -0.50
OD_SPHERE: +1.00
OU_VA: 20/25
OS_ADD: +2.50
OD_SPHERE: +0.75

## 2025-05-28 ASSESSMENT — REFRACTION_CURRENTRX
OS_OVR_VA: 20/
OD_OVR_VA: 20/
OS_SPHERE: +0.75
OS_ADD: +2.75
OD_VPRISM_DIRECTION: PROGS
OD_SPHERE: +1.00
OD_ADD: +2.75
OS_CYLINDER: -0.25
OD_AXIS: 080
OD_CYLINDER: -0.50
OS_AXIS: 100
OS_VPRISM_DIRECTION: PROGS

## 2025-05-28 ASSESSMENT — LID EXAM ASSESSMENTS
OS_BLEPHARITIS: LUL T
OD_BLEPHARITIS: RUL T

## 2025-05-28 ASSESSMENT — VISUAL ACUITY
OS_BCVA: 20/30-1
OD_BCVA: 20/25-2

## 2025-05-28 ASSESSMENT — KERATOMETRY
OS_AXISANGLE_DEGREES: 092
OD_K2POWER_DIOPTERS: 47.50
OS_K1POWER_DIOPTERS: 46.00
METHOD_AUTO_MANUAL: AUTO
OD_K1POWER_DIOPTERS: 46.00
OD_AXISANGLE_DEGREES: 099
OS_K2POWER_DIOPTERS: 47.25

## 2025-05-28 ASSESSMENT — TONOMETRY
OD_IOP_MMHG: 10
OS_IOP_MMHG: 12

## 2025-05-28 ASSESSMENT — REFRACTION_AUTOREFRACTION
OD_AXIS: 084
OD_CYLINDER: -1.00
OS_CYLINDER: -0.25
OD_SPHERE: +1.00
OS_AXIS: 010
OS_SPHERE: +0.25

## 2025-05-28 ASSESSMENT — SUPERFICIAL PUNCTATE KERATITIS (SPK)
OS_SPK: T
OD_SPK: T

## 2025-05-28 ASSESSMENT — CONFRONTATIONAL VISUAL FIELD TEST (CVF)
OD_FINDINGS: FULL
OS_FINDINGS: FULL

## 2025-05-28 ASSESSMENT — LID POSITION - PTOSIS
OS_PTOSIS: LUL
OD_PTOSIS: RUL